# Patient Record
Sex: FEMALE | Race: WHITE | Employment: FULL TIME | URBAN - METROPOLITAN AREA
[De-identification: names, ages, dates, MRNs, and addresses within clinical notes are randomized per-mention and may not be internally consistent; named-entity substitution may affect disease eponyms.]

---

## 2017-03-19 ENCOUNTER — HOSPITAL ENCOUNTER (OUTPATIENT)
Dept: OTHER | Age: 32
Discharge: HOME | End: 2017-03-19
Attending: INTERNAL MEDICINE

## 2017-03-21 LAB
CULTURE: NORMAL
REPORT STATUS: NORMAL
REQUEST PROBLEM: NORMAL
SPECIMEN: NORMAL

## 2018-10-12 ENCOUNTER — HOSPITAL ENCOUNTER (OUTPATIENT)
Age: 33
Setting detail: SPECIMEN
Discharge: HOME | End: 2018-10-12
Payer: COMMERCIAL

## 2018-10-12 PROCEDURE — 87086 URINE CULTURE/COLONY COUNT: CPT

## 2018-10-12 PROCEDURE — 87077 CULTURE AEROBIC IDENTIFY: CPT

## 2018-10-12 PROCEDURE — 87186 SC STD MICRODIL/AGAR DIL: CPT

## 2018-10-15 LAB
CULTURE: NORMAL
Lab: NORMAL
ORGANISM: NORMAL
REPORT STATUS: NORMAL
SPECIMEN: NORMAL
TOTAL COLONY COUNT: NORMAL

## 2018-12-08 ENCOUNTER — APPOINTMENT (OUTPATIENT)
Dept: CT IMAGING | Age: 33
End: 2018-12-08
Payer: COMMERCIAL

## 2018-12-08 ENCOUNTER — HOSPITAL ENCOUNTER (EMERGENCY)
Age: 33
Discharge: HOME | End: 2018-12-08
Attending: EMERGENCY MEDICINE
Payer: COMMERCIAL

## 2018-12-08 ENCOUNTER — APPOINTMENT (OUTPATIENT)
Dept: GENERAL RADIOLOGY | Age: 33
End: 2018-12-08
Payer: COMMERCIAL

## 2018-12-08 VITALS
OXYGEN SATURATION: 98 % | SYSTOLIC BLOOD PRESSURE: 89 MMHG | WEIGHT: 105 LBS | BODY MASS INDEX: 18.61 KG/M2 | HEART RATE: 84 BPM | HEIGHT: 63 IN | DIASTOLIC BLOOD PRESSURE: 61 MMHG | TEMPERATURE: 97.8 F | RESPIRATION RATE: 20 BRPM

## 2018-12-08 DIAGNOSIS — J02.9 ACUTE PHARYNGITIS, UNSPECIFIED ETIOLOGY: Primary | ICD-10-CM

## 2018-12-08 DIAGNOSIS — D72.829 LEUKOCYTOSIS, UNSPECIFIED TYPE: ICD-10-CM

## 2018-12-08 LAB
ALBUMIN SERPL-MCNC: 4.3 GM/DL (ref 3.4–5)
ALP BLD-CCNC: 123 IU/L (ref 40–129)
ALT SERPL-CCNC: 15 U/L (ref 10–40)
ANION GAP SERPL CALCULATED.3IONS-SCNC: 10 MMOL/L (ref 4–16)
AST SERPL-CCNC: 19 IU/L (ref 15–37)
BACTERIA: NEGATIVE /HPF
BASOPHILS ABSOLUTE: 0.1 K/CU MM
BASOPHILS RELATIVE PERCENT: 0.4 % (ref 0–1)
BILIRUB SERPL-MCNC: 0.4 MG/DL (ref 0–1)
BILIRUBIN URINE: NEGATIVE MG/DL
BLOOD, URINE: ABNORMAL
BUN BLDV-MCNC: 15 MG/DL (ref 6–23)
CALCIUM SERPL-MCNC: 9.4 MG/DL (ref 8.3–10.6)
CHLORIDE BLD-SCNC: 97 MMOL/L (ref 99–110)
CLARITY: CLEAR
CO2: 28 MMOL/L (ref 21–32)
COLOR: YELLOW
CREAT SERPL-MCNC: 0.9 MG/DL (ref 0.6–1.1)
DIFFERENTIAL TYPE: ABNORMAL
EOSINOPHILS ABSOLUTE: 0 K/CU MM
EOSINOPHILS RELATIVE PERCENT: 0.1 % (ref 0–3)
GFR AFRICAN AMERICAN: >60 ML/MIN/1.73M2
GFR NON-AFRICAN AMERICAN: >60 ML/MIN/1.73M2
GLUCOSE BLD-MCNC: 133 MG/DL (ref 70–99)
GLUCOSE BLD-MCNC: 139 MG/DL (ref 70–99)
GLUCOSE, URINE: NEGATIVE MG/DL
HCG QUALITATIVE: NEGATIVE
HCT VFR BLD CALC: 46 % (ref 37–47)
HEMOGLOBIN: 15.2 GM/DL (ref 12.5–16)
HETEROPHILE ANTIBODIES: NEGATIVE
IMMATURE NEUTROPHIL %: 0.7 % (ref 0–0.43)
KETONES, URINE: NEGATIVE MG/DL
LACTATE: 1 MMOL/L (ref 0.4–2)
LEUKOCYTE ESTERASE, URINE: NEGATIVE
LYMPHOCYTES ABSOLUTE: 1.9 K/CU MM
LYMPHOCYTES RELATIVE PERCENT: 8.3 % (ref 24–44)
MCH RBC QN AUTO: 31.9 PG (ref 27–31)
MCHC RBC AUTO-ENTMCNC: 33 % (ref 32–36)
MCV RBC AUTO: 96.6 FL (ref 78–100)
MONOCYTES ABSOLUTE: 2 K/CU MM
MONOCYTES RELATIVE PERCENT: 8.6 % (ref 0–4)
MUCUS: ABNORMAL HPF
NITRITE URINE, QUANTITATIVE: NEGATIVE
NUCLEATED RBC %: 0 %
PDW BLD-RTO: 12 % (ref 11.7–14.9)
PH, URINE: 6 (ref 5–8)
PLATELET # BLD: 258 K/CU MM (ref 140–440)
PMV BLD AUTO: 11.2 FL (ref 7.5–11.1)
POTASSIUM SERPL-SCNC: 4 MMOL/L (ref 3.5–5.1)
PROTEIN UA: 30 MG/DL
RAPID INFLUENZA  B AGN: NEGATIVE
RAPID INFLUENZA A AGN: NEGATIVE
RBC # BLD: 4.76 M/CU MM (ref 4.2–5.4)
RBC URINE: 12 /HPF (ref 0–6)
SEGMENTED NEUTROPHILS ABSOLUTE COUNT: 18.9 K/CU MM
SEGMENTED NEUTROPHILS RELATIVE PERCENT: 81.9 % (ref 36–66)
SODIUM BLD-SCNC: 135 MMOL/L (ref 135–145)
SPECIFIC GRAVITY UA: >1.06 (ref 1–1.03)
SQUAMOUS EPITHELIAL: 10 /HPF
TOTAL IMMATURE NEUTOROPHIL: 0.15 K/CU MM
TOTAL NUCLEATED RBC: 0 K/CU MM
TOTAL PROTEIN: 7.7 GM/DL (ref 6.4–8.2)
TRICHOMONAS: ABNORMAL /HPF
TROPONIN T: <0.01 NG/ML
UROBILINOGEN, URINE: NORMAL MG/DL (ref 0.2–1)
WBC # BLD: 23 K/CU MM (ref 4–10.5)
WBC UA: <1 /HPF (ref 0–5)

## 2018-12-08 PROCEDURE — 96374 THER/PROPH/DIAG INJ IV PUSH: CPT

## 2018-12-08 PROCEDURE — 87081 CULTURE SCREEN ONLY: CPT

## 2018-12-08 PROCEDURE — 99284 EMERGENCY DEPT VISIT MOD MDM: CPT

## 2018-12-08 PROCEDURE — 82962 GLUCOSE BLOOD TEST: CPT

## 2018-12-08 PROCEDURE — 83605 ASSAY OF LACTIC ACID: CPT

## 2018-12-08 PROCEDURE — 2580000003 HC RX 258: Performed by: EMERGENCY MEDICINE

## 2018-12-08 PROCEDURE — 85025 COMPLETE CBC W/AUTO DIFF WBC: CPT

## 2018-12-08 PROCEDURE — 71045 X-RAY EXAM CHEST 1 VIEW: CPT

## 2018-12-08 PROCEDURE — 80053 COMPREHEN METABOLIC PANEL: CPT

## 2018-12-08 PROCEDURE — 6360000004 HC RX CONTRAST MEDICATION: Performed by: EMERGENCY MEDICINE

## 2018-12-08 PROCEDURE — 86318 IA INFECTIOUS AGENT ANTIBODY: CPT

## 2018-12-08 PROCEDURE — 87430 STREP A AG IA: CPT

## 2018-12-08 PROCEDURE — 6360000002 HC RX W HCPCS: Performed by: EMERGENCY MEDICINE

## 2018-12-08 PROCEDURE — 70491 CT SOFT TISSUE NECK W/DYE: CPT

## 2018-12-08 PROCEDURE — 6370000000 HC RX 637 (ALT 250 FOR IP): Performed by: EMERGENCY MEDICINE

## 2018-12-08 PROCEDURE — 81001 URINALYSIS AUTO W/SCOPE: CPT

## 2018-12-08 PROCEDURE — 87804 INFLUENZA ASSAY W/OPTIC: CPT

## 2018-12-08 PROCEDURE — 84703 CHORIONIC GONADOTROPIN ASSAY: CPT

## 2018-12-08 PROCEDURE — 84484 ASSAY OF TROPONIN QUANT: CPT

## 2018-12-08 PROCEDURE — 36415 COLL VENOUS BLD VENIPUNCTURE: CPT

## 2018-12-08 PROCEDURE — 93005 ELECTROCARDIOGRAM TRACING: CPT | Performed by: EMERGENCY MEDICINE

## 2018-12-08 RX ORDER — 0.9 % SODIUM CHLORIDE 0.9 %
1000 INTRAVENOUS SOLUTION INTRAVENOUS ONCE
Status: COMPLETED | OUTPATIENT
Start: 2018-12-08 | End: 2018-12-08

## 2018-12-08 RX ORDER — AZITHROMYCIN 250 MG/1
TABLET, FILM COATED ORAL
Qty: 1 PACKET | Refills: 0 | Status: SHIPPED | OUTPATIENT
Start: 2018-12-08 | End: 2018-12-18

## 2018-12-08 RX ORDER — AZITHROMYCIN 250 MG/1
500 TABLET, FILM COATED ORAL ONCE
Status: COMPLETED | OUTPATIENT
Start: 2018-12-08 | End: 2018-12-08

## 2018-12-08 RX ORDER — KETOROLAC TROMETHAMINE 30 MG/ML
30 INJECTION, SOLUTION INTRAMUSCULAR; INTRAVENOUS ONCE
Status: COMPLETED | OUTPATIENT
Start: 2018-12-08 | End: 2018-12-08

## 2018-12-08 RX ORDER — METHYLPREDNISOLONE 4 MG/1
TABLET ORAL
Qty: 1 KIT | Refills: 0 | Status: SHIPPED | OUTPATIENT
Start: 2018-12-08 | End: 2019-11-02

## 2018-12-08 RX ORDER — IBUPROFEN 600 MG/1
600 TABLET ORAL EVERY 6 HOURS PRN
Qty: 20 TABLET | Refills: 0 | Status: SHIPPED | OUTPATIENT
Start: 2018-12-08 | End: 2019-01-07

## 2018-12-08 RX ADMIN — SODIUM CHLORIDE 1000 ML: 9 INJECTION, SOLUTION INTRAVENOUS at 17:49

## 2018-12-08 RX ADMIN — DEXAMETHASONE INTENSOL 10 MG: 1 SOLUTION, CONCENTRATE ORAL at 17:49

## 2018-12-08 RX ADMIN — KETOROLAC TROMETHAMINE 30 MG: 30 INJECTION, SOLUTION INTRAMUSCULAR at 15:52

## 2018-12-08 RX ADMIN — AZITHROMYCIN 500 MG: 250 TABLET, FILM COATED ORAL at 19:50

## 2018-12-08 RX ADMIN — IOPAMIDOL 75 ML: 755 INJECTION, SOLUTION INTRAVENOUS at 17:42

## 2018-12-08 RX ADMIN — SODIUM CHLORIDE 1000 ML: 9 INJECTION, SOLUTION INTRAVENOUS at 15:52

## 2018-12-08 ASSESSMENT — PAIN DESCRIPTION - PAIN TYPE: TYPE: ACUTE PAIN

## 2018-12-08 ASSESSMENT — PAIN DESCRIPTION - LOCATION: LOCATION: THROAT

## 2018-12-08 ASSESSMENT — PAIN DESCRIPTION - ONSET: ONSET: ON-GOING

## 2018-12-08 ASSESSMENT — PAIN DESCRIPTION - FREQUENCY: FREQUENCY: CONTINUOUS

## 2018-12-08 ASSESSMENT — PAIN SCALES - GENERAL: PAINLEVEL_OUTOF10: 7

## 2018-12-08 NOTE — ED PROVIDER NOTES
Current Outpatient Prescriptions   Medication Sig Dispense Refill    ibuprofen (IBU) 600 MG tablet Take 1 tablet by mouth every 6 hours as needed for Pain 20 tablet 0    methylPREDNISolone (MEDROL DOSEPACK) 4 MG tablet Take by mouth. 1 kit 0    azithromycin (ZITHROMAX) 250 MG tablet Take 2 tablets (500 mg) on Day 1, followed by 1 tablet (250 mg) once daily on Days 2 through 5. 1 packet 0    Prenatal Multivit-Min-Fe-FA (PRENATAL #2 PO) Take by mouth       Allergies   Allergen Reactions    Cephalexin Swelling     Nursing Notes Reviewed    ROS:  At least 10 systems reviewed and otherwise negative except as in the [address] Physical Exam:  ED Triage Vitals [12/08/18 1510]   Enc Vitals Group      /74      Pulse 77      Resp 16      Temp 97.8 °F (36.6 °C)      Temp Source Oral      SpO2 100 %      Weight 105 lb (47.6 kg)      Height 5' 3\" (1.6 m)      Head Circumference       Peak Flow       Pain Score       Pain Loc       Pain Edu? Excl. in [address]? My pulse oximetry interpretation is which is within the normal range    GENERAL APPEARANCE: Awake and alert. Cooperative. No acute distress. HEAD: Normocephalic. Atraumatic. EYES: EOM's grossly intact. Sclera anicteric. ENT: Mucous membranes are moist. Tolerates saliva. No trismus. Erythematous throat. NECK: Supple. No meningismus. Trachea midline. HEART: RRR. Radial pulses 2+. LUNGS: Respirations unlabored. CTAB  ABDOMEN: Soft. Non-tender. No guarding or rebound. Normal bowel sounds. EXTREMITIES: No acute deformities. No pitting edema. SKIN: Warm and dry. NEUROLOGICAL: No gross facial drooping. Moves all 4 extremities spontaneously. PSYCHIATRIC: Normal mood.     I have reviewed and interpreted all of the currently available lab results from this visit (if applicable):  Results for orders placed or performed during the hospital encounter of 12/08/18   Strep Screen Group A Throat   Result Value Ref Range    Specimen THROAT     Special Requests

## 2018-12-09 LAB
EKG ATRIAL RATE: 102 BPM
EKG DIAGNOSIS: NORMAL
EKG P AXIS: 69 DEGREES
EKG P-R INTERVAL: 128 MS
EKG Q-T INTERVAL: 336 MS
EKG QRS DURATION: 82 MS
EKG QTC CALCULATION (BAZETT): 437 MS
EKG R AXIS: 56 DEGREES
EKG T AXIS: 60 DEGREES
EKG VENTRICULAR RATE: 102 BPM

## 2018-12-09 PROCEDURE — 93010 ELECTROCARDIOGRAM REPORT: CPT | Performed by: INTERNAL MEDICINE

## 2018-12-10 LAB
CULTURE: NORMAL
Lab: NORMAL
REPORT STATUS: NORMAL
SPECIMEN: NORMAL
STREP A DIRECT SCREEN: NEGATIVE

## 2018-12-19 ENCOUNTER — HOSPITAL ENCOUNTER (OUTPATIENT)
Dept: ULTRASOUND IMAGING | Age: 33
Discharge: HOME | End: 2018-12-19
Payer: COMMERCIAL

## 2018-12-19 DIAGNOSIS — R22.1 SENSATION OF LUMP IN THROAT: ICD-10-CM

## 2018-12-19 PROCEDURE — 76536 US EXAM OF HEAD AND NECK: CPT

## 2019-01-11 ENCOUNTER — HOSPITAL ENCOUNTER (EMERGENCY)
Age: 34
Discharge: HOME | End: 2019-01-11
Payer: COMMERCIAL

## 2019-01-11 ENCOUNTER — APPOINTMENT (OUTPATIENT)
Dept: GENERAL RADIOLOGY | Age: 34
End: 2019-01-11
Payer: COMMERCIAL

## 2019-01-11 VITALS
WEIGHT: 104 LBS | HEIGHT: 63 IN | RESPIRATION RATE: 16 BRPM | SYSTOLIC BLOOD PRESSURE: 101 MMHG | DIASTOLIC BLOOD PRESSURE: 71 MMHG | OXYGEN SATURATION: 98 % | TEMPERATURE: 97.7 F | HEART RATE: 77 BPM | BODY MASS INDEX: 18.43 KG/M2

## 2019-01-11 DIAGNOSIS — S59.912A INJURY OF LEFT FOREARM, INITIAL ENCOUNTER: Primary | ICD-10-CM

## 2019-01-11 PROCEDURE — 73090 X-RAY EXAM OF FOREARM: CPT

## 2019-01-11 PROCEDURE — 99283 EMERGENCY DEPT VISIT LOW MDM: CPT

## 2019-01-11 ASSESSMENT — PAIN SCALES - GENERAL: PAINLEVEL_OUTOF10: 7

## 2019-01-11 ASSESSMENT — PAIN DESCRIPTION - PAIN TYPE: TYPE: ACUTE PAIN

## 2019-04-07 ENCOUNTER — HOSPITAL ENCOUNTER (EMERGENCY)
Age: 34
Discharge: HOME | End: 2019-04-07
Payer: COMMERCIAL

## 2019-04-07 VITALS
DIASTOLIC BLOOD PRESSURE: 66 MMHG | HEIGHT: 63 IN | TEMPERATURE: 97.9 F | OXYGEN SATURATION: 99 % | WEIGHT: 104 LBS | RESPIRATION RATE: 15 BRPM | BODY MASS INDEX: 18.43 KG/M2 | HEART RATE: 99 BPM | SYSTOLIC BLOOD PRESSURE: 105 MMHG

## 2019-04-07 DIAGNOSIS — R10.2 SUPRAPUBIC ABDOMINAL PAIN: ICD-10-CM

## 2019-04-07 DIAGNOSIS — N39.0 URINARY TRACT INFECTION WITHOUT HEMATURIA, SITE UNSPECIFIED: Primary | ICD-10-CM

## 2019-04-07 LAB
ALBUMIN SERPL-MCNC: 4 GM/DL (ref 3.4–5)
ALP BLD-CCNC: 85 IU/L (ref 40–129)
ALT SERPL-CCNC: 19 U/L (ref 10–40)
ANION GAP SERPL CALCULATED.3IONS-SCNC: 9 MMOL/L (ref 4–16)
AST SERPL-CCNC: 20 IU/L (ref 15–37)
BACTERIA: ABNORMAL /HPF
BASOPHILS ABSOLUTE: 0.1 K/CU MM
BASOPHILS RELATIVE PERCENT: 0.7 % (ref 0–1)
BILIRUB SERPL-MCNC: 0.3 MG/DL (ref 0–1)
BILIRUBIN URINE: NEGATIVE MG/DL
BLOOD, URINE: ABNORMAL
BUN BLDV-MCNC: 15 MG/DL (ref 6–23)
CALCIUM SERPL-MCNC: 8.5 MG/DL (ref 8.3–10.6)
CHLORIDE BLD-SCNC: 100 MMOL/L (ref 99–110)
CLARITY: ABNORMAL
CO2: 26 MMOL/L (ref 21–32)
COLOR: YELLOW
CREAT SERPL-MCNC: 0.9 MG/DL (ref 0.6–1.1)
DIFFERENTIAL TYPE: ABNORMAL
EOSINOPHILS ABSOLUTE: 0.1 K/CU MM
EOSINOPHILS RELATIVE PERCENT: 1.2 % (ref 0–3)
GFR AFRICAN AMERICAN: >60 ML/MIN/1.73M2
GFR NON-AFRICAN AMERICAN: >60 ML/MIN/1.73M2
GLUCOSE BLD-MCNC: 81 MG/DL (ref 70–99)
GLUCOSE, URINE: NEGATIVE MG/DL
HCG QUALITATIVE: NEGATIVE
HCT VFR BLD CALC: 42.8 % (ref 37–47)
HEMOGLOBIN: 13.8 GM/DL (ref 12.5–16)
IMMATURE NEUTROPHIL %: 0.2 % (ref 0–0.43)
KETONES, URINE: NEGATIVE MG/DL
LEUKOCYTE ESTERASE, URINE: ABNORMAL
LIPASE: 33 IU/L (ref 13–60)
LYMPHOCYTES ABSOLUTE: 3.2 K/CU MM
LYMPHOCYTES RELATIVE PERCENT: 28.2 % (ref 24–44)
MCH RBC QN AUTO: 31.1 PG (ref 27–31)
MCHC RBC AUTO-ENTMCNC: 32.2 % (ref 32–36)
MCV RBC AUTO: 96.4 FL (ref 78–100)
MONOCYTES ABSOLUTE: 0.9 K/CU MM
MONOCYTES RELATIVE PERCENT: 8.1 % (ref 0–4)
MUCUS: ABNORMAL HPF
NITRITE URINE, QUANTITATIVE: NEGATIVE
NUCLEATED RBC %: 0 %
PDW BLD-RTO: 11.9 % (ref 11.7–14.9)
PH, URINE: 5 (ref 5–8)
PLATELET # BLD: 279 K/CU MM (ref 140–440)
PMV BLD AUTO: 10.7 FL (ref 7.5–11.1)
POTASSIUM SERPL-SCNC: 3.8 MMOL/L (ref 3.5–5.1)
PROTEIN UA: 30 MG/DL
RBC # BLD: 4.44 M/CU MM (ref 4.2–5.4)
RBC URINE: 7 /HPF (ref 0–6)
SEGMENTED NEUTROPHILS ABSOLUTE COUNT: 7 K/CU MM
SEGMENTED NEUTROPHILS RELATIVE PERCENT: 61.6 % (ref 36–66)
SODIUM BLD-SCNC: 135 MMOL/L (ref 135–145)
SPECIFIC GRAVITY UA: 1.03 (ref 1–1.03)
SQUAMOUS EPITHELIAL: 15 /HPF
TOTAL IMMATURE NEUTOROPHIL: 0.02 K/CU MM
TOTAL NUCLEATED RBC: 0 K/CU MM
TOTAL PROTEIN: 6.5 GM/DL (ref 6.4–8.2)
TRICHOMONAS: ABNORMAL /HPF
UROBILINOGEN, URINE: 1 MG/DL (ref 0.2–1)
WBC # BLD: 11.3 K/CU MM (ref 4–10.5)
WBC UA: 2 /HPF (ref 0–5)

## 2019-04-07 PROCEDURE — 84703 CHORIONIC GONADOTROPIN ASSAY: CPT

## 2019-04-07 PROCEDURE — 81001 URINALYSIS AUTO W/SCOPE: CPT

## 2019-04-07 PROCEDURE — 83690 ASSAY OF LIPASE: CPT

## 2019-04-07 PROCEDURE — 99284 EMERGENCY DEPT VISIT MOD MDM: CPT

## 2019-04-07 PROCEDURE — 85025 COMPLETE CBC W/AUTO DIFF WBC: CPT

## 2019-04-07 PROCEDURE — 36415 COLL VENOUS BLD VENIPUNCTURE: CPT

## 2019-04-07 PROCEDURE — 80053 COMPREHEN METABOLIC PANEL: CPT

## 2019-04-07 RX ORDER — SULFAMETHOXAZOLE AND TRIMETHOPRIM 800; 160 MG/1; MG/1
1 TABLET ORAL 2 TIMES DAILY
Qty: 14 TABLET | Refills: 0 | Status: SHIPPED | OUTPATIENT
Start: 2019-04-07 | End: 2019-04-14

## 2019-04-07 RX ORDER — PHENAZOPYRIDINE HYDROCHLORIDE 100 MG/1
100 TABLET, FILM COATED ORAL 3 TIMES DAILY PRN
Qty: 9 TABLET | Refills: 0 | Status: SHIPPED | OUTPATIENT
Start: 2019-04-07 | End: 2019-04-10

## 2019-04-07 ASSESSMENT — PAIN DESCRIPTION - PAIN TYPE: TYPE: ACUTE PAIN

## 2019-04-07 ASSESSMENT — PAIN DESCRIPTION - LOCATION: LOCATION: ABDOMEN

## 2019-04-07 ASSESSMENT — PAIN DESCRIPTION - ONSET: ONSET: ON-GOING

## 2019-04-07 ASSESSMENT — PAIN DESCRIPTION - ORIENTATION: ORIENTATION: ANTERIOR;LOWER;RIGHT;LEFT

## 2019-04-07 ASSESSMENT — PAIN DESCRIPTION - FREQUENCY: FREQUENCY: CONTINUOUS

## 2019-04-07 NOTE — ED NOTES
Patient refusing saline lock at this time. She states that she does not want any medications. Halie Gomez RN  04/07/19 1663

## 2019-04-07 NOTE — ED PROVIDER NOTES
eMERGENCY dEPARTMENT eNCOUnter         [address] [facility]     PCP: Hamilton Rocha MD    CHIEF COMPLAINT    Chief Complaint   Patient presents with    Abdominal Pain     started this morning       HPI    Angelica Foreman is a 26 y.o. female who presents with abdominal pain, dysuria and concern for UTI. Onset prior to arrival upon waking this morning. Context is patient states that she began having cramping pain across the lower abdomen. States that when she urinated, her urine looked  \"orange\" and was concerned she might be starting a UTI. States she has had similar symptoms with UTIs in the past.  Denying any dysuria until giving a urine sample in the ED. No gross hematuria, increased frequency or urgency. Denying any associated flank pain. States that she did go to work but had worsening pain and felt generally fatigued and reported subjective fevers. She was sent home and tried to be seen at an urgent care however they are all closed as it is Sunday. No nausea or vomiting or diarrhea. No concern for pregnancy, no abnormal vaginal discharge or concern for STD. Denies any history of previous abdominal surgeries or known GI disorders. Patient states that she had a history of a kidney stone as a teenager but none since. REVIEW OF SYSTEMS    Constitutional:  Denies fever, chills, weight loss or weakness   HENT:  Denies sore throat or ear pain   Cardiovascular:  Denies chest pain, palpitations or swelling   Respiratory:  Denies cough or shortness of breath   GI:  See HPI above  : See HPI  Musculoskeletal:  Denies back pain or groin pain or masses. No pain or swelling of extremities.   Skin:  Denies rash  Neurologic:  Denies headache, focal weakness or sensory changes   Endocrine:  Denies polyuria or polydypsia   Lymphatic:  Denies swollen glands     All other review of systems are negative  See HPI and nursing notes for additional information PAST MEDICAL & SURGICAL HISTORY    Past Medical History:   Diagnosis Date    Anxiety     Depression      No past surgical history on file. CURRENT MEDICATIONS    Current Outpatient Rx   Medication Sig Dispense Refill    phenazopyridine (PYRIDIUM) 100 MG tablet Take 1 tablet by mouth 3 times daily as needed for Pain (dysuria) 9 tablet 0    sulfamethoxazole-trimethoprim (BACTRIM DS) 800-160 MG per tablet Take 1 tablet by mouth 2 times daily for 7 days 14 tablet 0    sertraline (ZOLOFT) 50 MG tablet Take 50 mg by mouth daily      ibuprofen (IBU) 600 MG tablet Take 1 tablet by mouth every 6 hours as needed for Pain 20 tablet 0    methylPREDNISolone (MEDROL DOSEPACK) 4 MG tablet Take by mouth.  1 kit 0    Prenatal Multivit-Min-Fe-FA (PRENATAL #2 PO) Take by mouth         ALLERGIES    Allergies   Allergen Reactions    Cephalexin Swelling       SOCIAL AND FAMILY HISTORY    Social History     Socioeconomic History    Marital status: Legally      Spouse name: Not on file    Number of children: Not on file    Years of education: Not on file    Highest education level: Not on file   Occupational History    Not on file   Social Needs    Financial resource strain: Not on file    Food insecurity:     Worry: Not on file     Inability: Not on file    Transportation needs:     Medical: Not on file     Non-medical: Not on file   Tobacco Use    Smoking status: Current Every Day Smoker     Packs/day: 1.00     Types: Cigarettes    Smokeless tobacco: Never Used   Substance and Sexual Activity    Alcohol use: No     Comment: socaially     Drug use: No    Sexual activity: Not on file   Lifestyle    Physical activity:     Days per week: Not on file     Minutes per session: Not on file    Stress: Not on file   Relationships    Social connections:     Talks on phone: Not on file     Gets together: Not on file     Attends Sabianist service: Not on file     Active member of club or organization: Not on file     Attends meetings of clubs or organizations: Not on file     Relationship status: Not on file    Intimate partner violence:     Fear of current or ex partner: Not on file     Emotionally abused: Not on file     Physically abused: Not on file     Forced sexual activity: Not on file   Other Topics Concern    Not on file   Social History Narrative    Not on file     No family history on file. PHYSICAL EXAM    VITAL SIGNS: /66   Pulse 99   Temp 97.9 °F (36.6 °C) (Oral)   Resp 15   Ht 5' 3\" (1.6 m)   Wt 104 lb (47.2 kg)   SpO2 99%   BMI 18.42 kg/m²   General:  Well developed, thin female, nontoxic, In no acute distress  Eyes:  Sclera nonicteric, Conjunctiva moist, No discharge  Head:  Normocephalic, Atramautic  Neck/Lymphatics: Supple, no JVD, no swollen nodes  Respiratory:  Clear to ausculation bilaterally, No retractions, Non labored breathing  Cardiovascular:  RRR, No murmurs/gallops/thrills  GI:   No gross discoloration. Bowel sounds present in all quadrants, No audible bruits. Soft,  Nondistended. +mild suprapubic abdominal tenderness without rebound tenderness or guarding, No palpable pulsatile masses or obvious hernias. No McBurney's point tenderness Negative Rovsing sign. Negative Duque's sign.   Back:  No CVA tenderness to percussion bilaterally  Musculoskeletal:  No edema, No deformity  Peripheral Vascular: Distal pulses 2+ equal bilaterally  Integument: No rash, Normal turgor  Neurologic:  Alert & oriented, Normal speech  Psychiatric: Cooperative, pleasant affect       I have reviewed and interpreted all of the currently available lab results from this visit (if applicable):  Results for orders placed or performed during the hospital encounter of 04/07/19   CBC auto diff   Result Value Ref Range    WBC 11.3 (H) 4.0 - 10.5 K/CU MM    RBC 4.44 4.2 - 5.4 M/CU MM    Hemoglobin 13.8 12.5 - 16.0 GM/DL    Hematocrit 42.8 37 - 47 %    MCV 96.4 78 - 100 FL    MCH 31.1 (H) 27 - 31 PG    MCHC 32.2 32.0 - 36.0 %    RDW 11.9 11.7 - 14.9 %    Platelets 974 879 - 838 K/CU MM    MPV 10.7 7.5 - 11.1 FL    Differential Type AUTOMATED DIFFERENTIAL     Segs Relative 61.6 36 - 66 %    Lymphocytes % 28.2 24 - 44 %    Monocytes % 8.1 (H) 0 - 4 %    Eosinophils % 1.2 0 - 3 %    Basophils % 0.7 0 - 1 %    Segs Absolute 7.0 K/CU MM    Lymphocytes # 3.2 K/CU MM    Monocytes # 0.9 K/CU MM    Eosinophils # 0.1 K/CU MM    Basophils # 0.1 K/CU MM    Nucleated RBC % 0.0 %    Total Nucleated RBC 0.0 K/CU MM    Total Immature Neutrophil 0.02 K/CU MM    Immature Neutrophil % 0.2 0 - 0.43 %   CMP   Result Value Ref Range    Sodium 135 135 - 145 MMOL/L    Potassium 3.8 3.5 - 5.1 MMOL/L    Chloride 100 99 - 110 mMol/L    CO2 26 21 - 32 MMOL/L    BUN 15 6 - 23 MG/DL    CREATININE 0.9 0.6 - 1.1 MG/DL    Glucose 81 70 - 99 MG/DL    Calcium 8.5 8.3 - 10.6 MG/DL    Alb 4.0 3.4 - 5.0 GM/DL    Total Protein 6.5 6.4 - 8.2 GM/DL    Total Bilirubin 0.3 0.0 - 1.0 MG/DL    ALT 19 10 - 40 U/L    AST 20 15 - 37 IU/L    Alkaline Phosphatase 85 40 - 129 IU/L    GFR Non-African American >60 >60 mL/min/1.73m2    GFR African American >60 >60 mL/min/1.73m2    Anion Gap 9 4 - 16   Lipase   Result Value Ref Range    Lipase 33 13 - 60 IU/L   Urinalysis   Result Value Ref Range    Color, UA YELLOW YELLOW    Clarity, UA HAZY (A) CLEAR    Glucose, Urine NEGATIVE NEGATIVE MG/DL    Bilirubin Urine NEGATIVE NEGATIVE MG/DL    Ketones, Urine NEGATIVE NEGATIVE MG/DL    Specific Gravity, UA 1.027 1.001 - 1.035    Blood, Urine MODERATE (A) NEGATIVE    pH, Urine 5.0 5.0 - 8.0    Protein, UA 30 (A) NEGATIVE MG/DL    Urobilinogen, Urine 1 0.2 - 1.0 MG/DL    Nitrite Urine, Quantitative NEGATIVE NEGATIVE    Leukocyte Esterase, Urine TRACE (A) NEGATIVE    RBC, UA 7 (H) 0 - 6 /HPF    WBC, UA 2 0 - 5 /HPF    Bacteria, UA RARE (A) NEGATIVE /HPF    Squam Epithel, UA 15 /HPF    Mucus, UA FEW (A) NEGATIVE HPF    Trichomonas, UA NONE SEEN NONE SEEN /HPF   HCG Serum, care, initiation of oral antibiotics and close follow-up for serial abdominal exams cannot rule out other etiology early processes. Patient is comfortable and agreeable with this plan. I estimate there is low risk for acute appendicitis, bowel obstruction, cholecystitis, ruptured diverticulitis, incarcerated hernia, hemorrhagic pancreatitis, or perforated bowel/ulcer, ectopic pregnancy, ovarian torsion or tubo-ovarian ovarian abscess thus I consider the discharge disposition reasonable. She is discharged stable condition with pyridium. Reports an allergy to Keflex so per previous urine culture, started on bactrim. Encouraged rest, alternating tylenol and motrin for pain. Discussed close followup with PCP for serial abdominal exams. I discussed the unclear etiology of patient's symptoms today and the need for return to emergency department in 8-12 hours for repeat evaluation, sooner if symptoms worsen or any new symptoms develop. Patient agrees to return emergency department if symptoms worsen or any new symptoms develop. Comment: Please note this report has been produced using speech recognition software and may contain errors related to that system including errors in grammar, punctuation, and spelling, as well as words and phrases that may be inappropriate. If there are any questions or concerns please feel free to contact the dictating provider for clarification.           Enrique Grace PA-C  04/07/19 1958

## 2019-04-07 NOTE — ED TRIAGE NOTES
Pt. Arrived via EMS from home for anterior LLQ, LRQ abdominal pain. Pt. States that her urine is orange and red and that she is not currently taking any medications or antibiotics that are known to cause this symptom. Pt. Believes that she has a UTI, bladder, or Kidney infection. Pt. States that she has hx of these types of infections but, \"It's been a while since I've had one\". Secondary complaint: Pt. States that she recently switched makeup and the left side of her face is irritated and has lasted for a week. Pt. Requesting evaluation and wants to know \"Do I need an antibiotic to clear this up? I don't know why it won't go away, but I changed my makeup recently\". Pt is not seeing a dermatologist at this time. Pt states that it burns. Pt skin is scaly and is showing redness despite makeup on top of skin. Pt. States that she keeps picking at it.

## 2019-04-08 NOTE — PROGRESS NOTES
Patient prescribed Bactrim for acute cystitis. Patient called the ED stating that she is allergic to Sulfa and requested a new ABX be prescribed. Only allergy listed in chart is for Cephalexin (swelling). Spoke to [facility] who stated it was ok to call-in a new ABX for patient due to reported allergy. Prescription for Nitrofurantoin 100mg BID x 7 days called into [facility] ([address] [place] [address]). Allergy list in Epic updated.      Eduarda Farah RPh, PharmD, BCPS  4/8/2019   3:06 PM

## 2019-07-22 ENCOUNTER — HOSPITAL ENCOUNTER (OUTPATIENT)
Age: 34
Setting detail: SPECIMEN
Discharge: HOME | End: 2019-07-22
Payer: COMMERCIAL

## 2019-07-22 PROCEDURE — 87086 URINE CULTURE/COLONY COUNT: CPT

## 2019-07-22 PROCEDURE — 87186 SC STD MICRODIL/AGAR DIL: CPT

## 2019-07-22 PROCEDURE — 87077 CULTURE AEROBIC IDENTIFY: CPT

## 2019-07-25 LAB
CULTURE: ABNORMAL
Lab: ABNORMAL
SPECIMEN: ABNORMAL
TOTAL COLONY COUNT: ABNORMAL

## 2019-09-07 ENCOUNTER — HOSPITAL ENCOUNTER (EMERGENCY)
Age: 34
Discharge: LEFT BEFORE DISCHARGE | End: 2019-09-07
Payer: COMMERCIAL

## 2019-09-07 VITALS
SYSTOLIC BLOOD PRESSURE: 96 MMHG | HEART RATE: 85 BPM | RESPIRATION RATE: 16 BRPM | WEIGHT: 105 LBS | DIASTOLIC BLOOD PRESSURE: 68 MMHG | BODY MASS INDEX: 18.61 KG/M2 | TEMPERATURE: 97.4 F | OXYGEN SATURATION: 97 % | HEIGHT: 63 IN

## 2019-09-07 ASSESSMENT — PAIN DESCRIPTION - ORIENTATION: ORIENTATION: RIGHT

## 2019-09-07 ASSESSMENT — PAIN DESCRIPTION - PAIN TYPE: TYPE: ACUTE PAIN

## 2019-09-07 ASSESSMENT — PAIN DESCRIPTION - LOCATION: LOCATION: FLANK

## 2019-09-07 ASSESSMENT — PAIN SCALES - GENERAL: PAINLEVEL_OUTOF10: 7

## 2019-11-02 ENCOUNTER — APPOINTMENT (OUTPATIENT)
Dept: GENERAL RADIOLOGY | Age: 34
End: 2019-11-02
Payer: COMMERCIAL

## 2019-11-02 ENCOUNTER — HOSPITAL ENCOUNTER (EMERGENCY)
Age: 34
Discharge: HOME | End: 2019-11-02
Attending: EMERGENCY MEDICINE
Payer: COMMERCIAL

## 2019-11-02 VITALS
WEIGHT: 100 LBS | SYSTOLIC BLOOD PRESSURE: 95 MMHG | OXYGEN SATURATION: 99 % | TEMPERATURE: 98.1 F | BODY MASS INDEX: 17.72 KG/M2 | HEIGHT: 63 IN | RESPIRATION RATE: 16 BRPM | HEART RATE: 88 BPM | DIASTOLIC BLOOD PRESSURE: 74 MMHG

## 2019-11-02 DIAGNOSIS — J06.9 ACUTE UPPER RESPIRATORY INFECTION: ICD-10-CM

## 2019-11-02 DIAGNOSIS — N30.01 ACUTE CYSTITIS WITH HEMATURIA: Primary | ICD-10-CM

## 2019-11-02 LAB
BACTERIA: NEGATIVE /HPF
BILIRUBIN URINE: NEGATIVE MG/DL
BLOOD, URINE: ABNORMAL
CLARITY: ABNORMAL
COLOR: ABNORMAL
GLUCOSE, URINE: NEGATIVE MG/DL
INTERPRETATION: NORMAL
KETONES, URINE: ABNORMAL MG/DL
LEUKOCYTE ESTERASE, URINE: ABNORMAL
MUCUS: ABNORMAL HPF
NITRITE URINE, QUANTITATIVE: POSITIVE
PH, URINE: 5 (ref 5–8)
PREGNANCY, URINE: NEGATIVE
PROTEIN UA: 100 MG/DL
RBC URINE: 92 /HPF (ref 0–6)
SPECIFIC GRAVITY UA: 1.02 (ref 1–1.03)
SPECIFIC GRAVITY, URINE: 1.02 (ref 1–1.03)
SQUAMOUS EPITHELIAL: 9 /HPF
TRICHOMONAS: ABNORMAL /HPF
UROBILINOGEN, URINE: NORMAL MG/DL (ref 0.2–1)
WBC UA: 92 /HPF (ref 0–5)

## 2019-11-02 PROCEDURE — 87077 CULTURE AEROBIC IDENTIFY: CPT

## 2019-11-02 PROCEDURE — 81001 URINALYSIS AUTO W/SCOPE: CPT

## 2019-11-02 PROCEDURE — 6370000000 HC RX 637 (ALT 250 FOR IP): Performed by: EMERGENCY MEDICINE

## 2019-11-02 PROCEDURE — 71046 X-RAY EXAM CHEST 2 VIEWS: CPT

## 2019-11-02 PROCEDURE — 87186 SC STD MICRODIL/AGAR DIL: CPT

## 2019-11-02 PROCEDURE — 87086 URINE CULTURE/COLONY COUNT: CPT

## 2019-11-02 PROCEDURE — 93010 ELECTROCARDIOGRAM REPORT: CPT | Performed by: INTERNAL MEDICINE

## 2019-11-02 PROCEDURE — 81025 URINE PREGNANCY TEST: CPT

## 2019-11-02 PROCEDURE — 99284 EMERGENCY DEPT VISIT MOD MDM: CPT

## 2019-11-02 PROCEDURE — 93005 ELECTROCARDIOGRAM TRACING: CPT | Performed by: EMERGENCY MEDICINE

## 2019-11-02 RX ORDER — BENZONATATE 100 MG/1
100 CAPSULE ORAL 3 TIMES DAILY PRN
Qty: 20 CAPSULE | Refills: 0 | Status: SHIPPED | OUTPATIENT
Start: 2019-11-02 | End: 2019-11-09

## 2019-11-02 RX ORDER — CIPROFLOXACIN 500 MG/1
500 TABLET, FILM COATED ORAL 2 TIMES DAILY
Qty: 14 TABLET | Refills: 0 | Status: SHIPPED | OUTPATIENT
Start: 2019-11-02 | End: 2019-11-09

## 2019-11-02 RX ORDER — PHENAZOPYRIDINE HYDROCHLORIDE 100 MG/1
100 TABLET, FILM COATED ORAL 3 TIMES DAILY PRN
Qty: 10 TABLET | Refills: 0 | Status: SHIPPED | OUTPATIENT
Start: 2019-11-02 | End: 2019-11-06

## 2019-11-02 RX ORDER — PHENAZOPYRIDINE HYDROCHLORIDE 100 MG/1
200 TABLET, FILM COATED ORAL ONCE
Status: COMPLETED | OUTPATIENT
Start: 2019-11-02 | End: 2019-11-02

## 2019-11-02 RX ORDER — GUAIFENESIN AND CODEINE PHOSPHATE 100; 10 MG/5ML; MG/5ML
10 SOLUTION ORAL 3 TIMES DAILY PRN
Qty: 118 ML | Refills: 0 | Status: SHIPPED | OUTPATIENT
Start: 2019-11-02 | End: 2019-11-07

## 2019-11-02 RX ORDER — BENZONATATE 100 MG/1
100 CAPSULE ORAL ONCE
Status: COMPLETED | OUTPATIENT
Start: 2019-11-02 | End: 2019-11-02

## 2019-11-02 RX ORDER — CIPROFLOXACIN 500 MG/1
500 TABLET, FILM COATED ORAL ONCE
Status: COMPLETED | OUTPATIENT
Start: 2019-11-02 | End: 2019-11-02

## 2019-11-02 RX ADMIN — CIPROFLOXACIN HYDROCHLORIDE 500 MG: 500 TABLET, FILM COATED ORAL at 15:03

## 2019-11-02 RX ADMIN — PHENAZOPYRIDINE 200 MG: 100 TABLET ORAL at 15:03

## 2019-11-02 RX ADMIN — BENZONATATE 100 MG: 100 CAPSULE ORAL at 15:03

## 2019-11-02 ASSESSMENT — PAIN DESCRIPTION - DESCRIPTORS: DESCRIPTORS: ACHING

## 2019-11-02 ASSESSMENT — PAIN DESCRIPTION - PAIN TYPE: TYPE: ACUTE PAIN

## 2019-11-02 ASSESSMENT — PAIN DESCRIPTION - FREQUENCY: FREQUENCY: CONTINUOUS

## 2019-11-02 ASSESSMENT — PAIN DESCRIPTION - LOCATION: LOCATION: CHEST

## 2019-11-02 ASSESSMENT — PAIN SCALES - GENERAL: PAINLEVEL_OUTOF10: 4

## 2019-11-04 LAB
EKG ATRIAL RATE: 98 BPM
EKG DIAGNOSIS: NORMAL
EKG P AXIS: 74 DEGREES
EKG P-R INTERVAL: 112 MS
EKG Q-T INTERVAL: 476 MS
EKG QRS DURATION: 88 MS
EKG QTC CALCULATION (BAZETT): 607 MS
EKG R AXIS: 46 DEGREES
EKG T AXIS: 49 DEGREES
EKG VENTRICULAR RATE: 98 BPM

## 2019-11-05 LAB
CULTURE: ABNORMAL
Lab: ABNORMAL
SPECIMEN: ABNORMAL
TOTAL COLONY COUNT: ABNORMAL

## 2020-03-14 ENCOUNTER — APPOINTMENT (OUTPATIENT)
Dept: GENERAL RADIOLOGY | Age: 35
End: 2020-03-14
Payer: COMMERCIAL

## 2020-03-14 ENCOUNTER — HOSPITAL ENCOUNTER (EMERGENCY)
Age: 35
Discharge: HOME | End: 2020-03-14
Attending: EMERGENCY MEDICINE
Payer: COMMERCIAL

## 2020-03-14 VITALS
BODY MASS INDEX: 18.61 KG/M2 | HEART RATE: 83 BPM | TEMPERATURE: 97.9 F | DIASTOLIC BLOOD PRESSURE: 69 MMHG | SYSTOLIC BLOOD PRESSURE: 80 MMHG | OXYGEN SATURATION: 99 % | WEIGHT: 105 LBS | RESPIRATION RATE: 16 BRPM | HEIGHT: 63 IN

## 2020-03-14 LAB
ALBUMIN SERPL-MCNC: 4.2 GM/DL (ref 3.4–5)
ALP BLD-CCNC: 97 IU/L (ref 40–129)
ALT SERPL-CCNC: 17 U/L (ref 10–40)
ANION GAP SERPL CALCULATED.3IONS-SCNC: 14 MMOL/L (ref 4–16)
AST SERPL-CCNC: 27 IU/L (ref 15–37)
ATYPICAL LYMPHOCYTE ABSOLUTE COUNT: ABNORMAL
BACTERIA: ABNORMAL /HPF
BANDED NEUTROPHILS ABSOLUTE COUNT: 0.18 K/CU MM
BANDED NEUTROPHILS RELATIVE PERCENT: 2 % (ref 5–11)
BILIRUB SERPL-MCNC: 0.2 MG/DL (ref 0–1)
BILIRUBIN URINE: NEGATIVE MG/DL
BLOOD, URINE: ABNORMAL
BUN BLDV-MCNC: 14 MG/DL (ref 6–23)
CALCIUM SERPL-MCNC: 9.1 MG/DL (ref 8.3–10.6)
CHLORIDE BLD-SCNC: 98 MMOL/L (ref 99–110)
CLARITY: ABNORMAL
CO2: 23 MMOL/L (ref 21–32)
COLOR: ABNORMAL
CREAT SERPL-MCNC: 1.1 MG/DL (ref 0.6–1.1)
DIFFERENTIAL TYPE: ABNORMAL
EOSINOPHILS ABSOLUTE: 0.2 K/CU MM
EOSINOPHILS RELATIVE PERCENT: 2 % (ref 0–3)
GFR AFRICAN AMERICAN: >60 ML/MIN/1.73M2
GFR NON-AFRICAN AMERICAN: 56 ML/MIN/1.73M2
GLUCOSE BLD-MCNC: 127 MG/DL (ref 70–99)
GLUCOSE, URINE: NEGATIVE MG/DL
HCT VFR BLD CALC: 50.5 % (ref 37–47)
HEMOGLOBIN: 15.9 GM/DL (ref 12.5–16)
INTERPRETATION: NORMAL
KETONES, URINE: ABNORMAL MG/DL
LEUKOCYTE ESTERASE, URINE: ABNORMAL
LYMPHOCYTES ABSOLUTE: 4.8 K/CU MM
LYMPHOCYTES RELATIVE PERCENT: 55 % (ref 24–44)
MCH RBC QN AUTO: 31.4 PG (ref 27–31)
MCHC RBC AUTO-ENTMCNC: 31.5 % (ref 32–36)
MCV RBC AUTO: 99.8 FL (ref 78–100)
MONOCYTES ABSOLUTE: 1.1 K/CU MM
MONOCYTES RELATIVE PERCENT: 13 % (ref 0–4)
MUCUS: ABNORMAL HPF
NITRITE URINE, QUANTITATIVE: NEGATIVE
PDW BLD-RTO: 12.3 % (ref 11.7–14.9)
PH, URINE: 5 (ref 5–8)
PLATELET # BLD: 275 K/CU MM (ref 140–440)
PLT MORPHOLOGY: ABNORMAL
PMV BLD AUTO: 11 FL (ref 7.5–11.1)
POTASSIUM SERPL-SCNC: 4.3 MMOL/L (ref 3.5–5.1)
PREGNANCY, URINE: NEGATIVE
PROTEIN UA: 100 MG/DL
RAPID INFLUENZA  B AGN: NEGATIVE
RAPID INFLUENZA A AGN: NEGATIVE
RBC # BLD: 5.06 M/CU MM (ref 4.2–5.4)
RBC URINE: 88 /HPF (ref 0–6)
SEGMENTED NEUTROPHILS ABSOLUTE COUNT: 2.5 K/CU MM
SEGMENTED NEUTROPHILS RELATIVE PERCENT: 28 % (ref 36–66)
SMUDGE CELLS: PRESENT
SODIUM BLD-SCNC: 135 MMOL/L (ref 135–145)
SPECIFIC GRAVITY UA: 1.03 (ref 1–1.03)
SPECIFIC GRAVITY, URINE: 1.03 (ref 1–1.03)
SQUAMOUS EPITHELIAL: 54 /HPF
TOTAL PROTEIN: 7.1 GM/DL (ref 6.4–8.2)
TRICHOMONAS: ABNORMAL /HPF
UROBILINOGEN, URINE: 1 MG/DL (ref 0.2–1)
WBC # BLD: 8.8 K/CU MM (ref 4–10.5)
WBC # BLD: ABNORMAL 10*3/UL
WBC UA: 15 /HPF (ref 0–5)

## 2020-03-14 PROCEDURE — 96366 THER/PROPH/DIAG IV INF ADDON: CPT

## 2020-03-14 PROCEDURE — 80053 COMPREHEN METABOLIC PANEL: CPT

## 2020-03-14 PROCEDURE — 6360000002 HC RX W HCPCS: Performed by: PHYSICIAN ASSISTANT

## 2020-03-14 PROCEDURE — 85027 COMPLETE CBC AUTOMATED: CPT

## 2020-03-14 PROCEDURE — 85007 BL SMEAR W/DIFF WBC COUNT: CPT

## 2020-03-14 PROCEDURE — 87804 INFLUENZA ASSAY W/OPTIC: CPT

## 2020-03-14 PROCEDURE — 99283 EMERGENCY DEPT VISIT LOW MDM: CPT

## 2020-03-14 PROCEDURE — 71046 X-RAY EXAM CHEST 2 VIEWS: CPT

## 2020-03-14 PROCEDURE — 2580000003 HC RX 258: Performed by: PHYSICIAN ASSISTANT

## 2020-03-14 PROCEDURE — 87081 CULTURE SCREEN ONLY: CPT

## 2020-03-14 PROCEDURE — 81025 URINE PREGNANCY TEST: CPT

## 2020-03-14 PROCEDURE — 87430 STREP A AG IA: CPT

## 2020-03-14 PROCEDURE — 96365 THER/PROPH/DIAG IV INF INIT: CPT

## 2020-03-14 PROCEDURE — 81001 URINALYSIS AUTO W/SCOPE: CPT

## 2020-03-14 PROCEDURE — 96361 HYDRATE IV INFUSION ADD-ON: CPT

## 2020-03-14 RX ORDER — LEVOFLOXACIN 5 MG/ML
750 INJECTION, SOLUTION INTRAVENOUS EVERY 24 HOURS
Status: DISCONTINUED | OUTPATIENT
Start: 2020-03-14 | End: 2020-03-14 | Stop reason: HOSPADM

## 2020-03-14 RX ORDER — 0.9 % SODIUM CHLORIDE 0.9 %
1000 INTRAVENOUS SOLUTION INTRAVENOUS ONCE
Status: COMPLETED | OUTPATIENT
Start: 2020-03-14 | End: 2020-03-14

## 2020-03-14 RX ORDER — NITROFURANTOIN 25; 75 MG/1; MG/1
100 CAPSULE ORAL 2 TIMES DAILY
Qty: 14 CAPSULE | Refills: 0 | Status: SHIPPED | OUTPATIENT
Start: 2020-03-14 | End: 2020-03-21

## 2020-03-14 RX ADMIN — SODIUM CHLORIDE 1000 ML: 9 INJECTION, SOLUTION INTRAVENOUS at 13:12

## 2020-03-14 RX ADMIN — LEVOFLOXACIN 750 MG: 5 INJECTION, SOLUTION INTRAVENOUS at 15:22

## 2020-03-14 NOTE — LETTER
[facility] Emergency Department  [address] [facility] [address]  Phone: [phone]  Fax: [phone]    No name on file. March 14, 2020     Patient: Inder Roach   YOB: 1985   Date of Visit: 3/14/2020       To Whom It May Concern: It is my medical opinion that Samantha Teran may return to work on 03/16/2020. If you have any questions or concerns, please don't hesitate to call.     Sincerely,        Khoa Box RN

## 2020-03-14 NOTE — ED PROVIDER NOTES
K/CU MM    Differential Type MANUAL DIFFERENTIAL     Atypical Lymphocytes Absolute 2+     Smudge Cells PRESENT     WBC Morphology RBC MORPHOLOGY NORMAL     PLT Morphology FEW  LARGE  PLT NOTED ON SCAN      Comprehensive Metabolic Panel w/ Reflex to MG   Result Value Ref Range    Sodium 135 135 - 145 MMOL/L    Potassium 4.3 3.5 - 5.1 MMOL/L    Chloride 98 (L) 99 - 110 mMol/L    CO2 23 21 - 32 MMOL/L    BUN 14 6 - 23 MG/DL    CREATININE 1.1 0.6 - 1.1 MG/DL    Glucose 127 (H) 70 - 99 MG/DL    Calcium 9.1 8.3 - 10.6 MG/DL    Alb 4.2 3.4 - 5.0 GM/DL    Total Protein 7.1 6.4 - 8.2 GM/DL    Total Bilirubin 0.2 0.0 - 1.0 MG/DL    ALT 17 10 - 40 U/L    AST 27 15 - 37 IU/L    Alkaline Phosphatase 97 40 - 129 IU/L    GFR Non- 56 (L) >60 mL/min/1.73m2    GFR African American >60 >60 mL/min/1.73m2    Anion Gap 14 4 - 16   Urinalysis, reflex to microscopic   Result Value Ref Range    Color, UA BUD (A) YELLOW    Clarity, UA CLOUDY (A) CLEAR    Glucose, Urine NEGATIVE NEGATIVE MG/DL    Bilirubin Urine NEGATIVE NEGATIVE MG/DL    Ketones, Urine SMALL (A) NEGATIVE MG/DL    Specific Gravity, UA 1.027 1.001 - 1.035    Blood, Urine MODERATE (A) NEGATIVE    pH, Urine 5.0 5.0 - 8.0    Protein,  (A) NEGATIVE MG/DL    Urobilinogen, Urine 1 0.2 - 1.0 MG/DL    Nitrite Urine, Quantitative NEGATIVE NEGATIVE    Leukocyte Esterase, Urine MODERATE (A) NEGATIVE    RBC, UA 88 (H) 0 - 6 /HPF    WBC, UA 15 (H) 0 - 5 /HPF    Bacteria, UA MANY (A) NEGATIVE /HPF    Squam Epithel, UA 54 /HPF    Mucus, UA MANY (A) NEGATIVE HPF    Trichomonas, UA NONE SEEN NONE SEEN /HPF   Pregnancy, Urine   Result Value Ref Range    Pregnancy, Urine NEGATIVE NEGATIVE    Specific Gravity, Urine 1.027 1.001 - 1.035    Interpretation       HCG METHOD LIMITATIONS:  Very dilute specimens, as indicated  by low specific gravity, may have  insufficient concentration of HCG  to bring about a positive result.                   EKG        RADIOLOGY      XR CHEST STANDARD (2 VW)   Final Result   No acute cardiopulmonary process. ED COURSE & MEDICAL DECISION MAKING       Patient remained in stable condition throughout her ED course. Her revealed a negative strep screen negative influenza. CBC revealed no leukocytosis he had a mild hypochloremia and hyperglycemia. Chest x-ray revealed no acute cardiopulmonary process. Urine revealed urinary tract infection. She will be discharged at this time with antibiotics, told to follow-up with Dr. Rene Cheatham in 2 to 3 days and return to ED with any more problems. I discussed the case with Dr. Trudy Hancock he agreed with therapeutic modalities and discharge plans. Patient agrees to return emergency department if symptoms worsen or any new symptoms develop. Vital signs and nursing notes reviewed during ED course. Clinical  IMPRESSION    1. Acute cystitis without hematuria              Comment: Please note this report has been produced using speech recognition software and may contain errors related to that system including errors in grammar, punctuation, and spelling, as well as words and phrases that may be inappropriate. If there are any questions or concerns please feel free to contact the dictating provider for clarification.         [address] [facility]  03/15/20 1923

## 2020-03-14 NOTE — ED TRIAGE NOTES
Pt presents to the ED c/o fever that started on Thursday. Pt also stated that today she started feeling lightheaded.

## 2020-03-16 LAB
CULTURE: NORMAL
Lab: NORMAL
SPECIMEN: NORMAL
STREP A DIRECT SCREEN: NEGATIVE

## 2020-09-08 ENCOUNTER — HOSPITAL ENCOUNTER (EMERGENCY)
Age: 35
Discharge: HOME | End: 2020-09-08
Attending: EMERGENCY MEDICINE
Payer: COMMERCIAL

## 2020-09-08 VITALS
DIASTOLIC BLOOD PRESSURE: 78 MMHG | BODY MASS INDEX: 18.61 KG/M2 | RESPIRATION RATE: 13 BRPM | TEMPERATURE: 98.6 F | OXYGEN SATURATION: 94 % | WEIGHT: 105 LBS | HEART RATE: 93 BPM | SYSTOLIC BLOOD PRESSURE: 105 MMHG | HEIGHT: 63 IN

## 2020-09-08 LAB
ACETAMINOPHEN LEVEL: <5 UG/ML (ref 15–30)
ALBUMIN SERPL-MCNC: 4.6 GM/DL (ref 3.4–5)
ALCOHOL SCREEN SERUM: <0.01 %WT/VOL
ALP BLD-CCNC: 104 IU/L (ref 40–129)
ALT SERPL-CCNC: 14 U/L (ref 10–40)
AMPHETAMINES: NEGATIVE
ANION GAP SERPL CALCULATED.3IONS-SCNC: 16 MMOL/L (ref 4–16)
AST SERPL-CCNC: 26 IU/L (ref 15–37)
BACTERIA: ABNORMAL /HPF
BARBITURATE SCREEN URINE: NEGATIVE
BASOPHILS ABSOLUTE: 0.1 K/CU MM
BASOPHILS RELATIVE PERCENT: 0.4 % (ref 0–1)
BENZODIAZEPINE SCREEN, URINE: NEGATIVE
BILIRUB SERPL-MCNC: 0.2 MG/DL (ref 0–1)
BILIRUBIN URINE: NEGATIVE MG/DL
BLOOD, URINE: ABNORMAL
BUN BLDV-MCNC: 18 MG/DL (ref 6–23)
CALCIUM OXALATE CRYSTALS: ABNORMAL /HPF
CALCIUM SERPL-MCNC: 9.5 MG/DL (ref 8.3–10.6)
CANNABINOID SCREEN URINE: ABNORMAL
CHLORIDE BLD-SCNC: 103 MMOL/L (ref 99–110)
CLARITY: ABNORMAL
CO2: 21 MMOL/L (ref 21–32)
COCAINE METABOLITE: ABNORMAL
COLOR: YELLOW
CREAT SERPL-MCNC: 1.4 MG/DL (ref 0.6–1.1)
DIFFERENTIAL TYPE: ABNORMAL
DOSE AMOUNT: ABNORMAL
DOSE AMOUNT: ABNORMAL
DOSE TIME: ABNORMAL
DOSE TIME: ABNORMAL
EOSINOPHILS ABSOLUTE: 0 K/CU MM
EOSINOPHILS RELATIVE PERCENT: 0.2 % (ref 0–3)
GFR AFRICAN AMERICAN: 51 ML/MIN/1.73M2
GFR NON-AFRICAN AMERICAN: 42 ML/MIN/1.73M2
GLUCOSE BLD-MCNC: 258 MG/DL (ref 70–99)
GLUCOSE, URINE: 50 MG/DL
GONADOTROPIN, CHORIONIC (HCG) QUANT: <0.5 UIU/ML
HCT VFR BLD CALC: 41.2 % (ref 37–47)
HEMOGLOBIN: 14.1 GM/DL (ref 12.5–16)
HYALINE CASTS: 10 /LPF
IMMATURE NEUTROPHIL %: 0.8 % (ref 0–0.43)
KETONES, URINE: NEGATIVE MG/DL
LEUKOCYTE ESTERASE, URINE: ABNORMAL
LYMPHOCYTES ABSOLUTE: 1.5 K/CU MM
LYMPHOCYTES RELATIVE PERCENT: 7.4 % (ref 24–44)
MCH RBC QN AUTO: 32 PG (ref 27–31)
MCHC RBC AUTO-ENTMCNC: 34.2 % (ref 32–36)
MCV RBC AUTO: 93.6 FL (ref 78–100)
MONOCYTES ABSOLUTE: 1.1 K/CU MM
MONOCYTES RELATIVE PERCENT: 5.6 % (ref 0–4)
MUCUS: ABNORMAL HPF
NITRITE URINE, QUANTITATIVE: NEGATIVE
NUCLEATED RBC %: 0 %
OPIATES, URINE: NEGATIVE
OXYCODONE: NEGATIVE
PDW BLD-RTO: 12 % (ref 11.7–14.9)
PH, URINE: 6 (ref 5–8)
PHENCYCLIDINE, URINE: NEGATIVE
PLATELET # BLD: 390 K/CU MM (ref 140–440)
PMV BLD AUTO: 11.6 FL (ref 7.5–11.1)
POTASSIUM SERPL-SCNC: 3.2 MMOL/L (ref 3.5–5.1)
PROTEIN UA: 100 MG/DL
RBC # BLD: 4.4 M/CU MM (ref 4.2–5.4)
RBC URINE: 30 /HPF (ref 0–6)
SALICYLATE LEVEL: 1.8 MG/DL (ref 15–30)
SEGMENTED NEUTROPHILS ABSOLUTE COUNT: 17 K/CU MM
SEGMENTED NEUTROPHILS RELATIVE PERCENT: 85.6 % (ref 36–66)
SODIUM BLD-SCNC: 140 MMOL/L (ref 135–145)
SPECIFIC GRAVITY UA: 1.02 (ref 1–1.03)
SQUAMOUS EPITHELIAL: 34 /HPF
TOTAL IMMATURE NEUTOROPHIL: 0.16 K/CU MM
TOTAL NUCLEATED RBC: 0 K/CU MM
TOTAL PROTEIN: 7 GM/DL (ref 6.4–8.2)
TRICHOMONAS: ABNORMAL /HPF
UROBILINOGEN, URINE: NORMAL MG/DL (ref 0.2–1)
WBC # BLD: 19.8 K/CU MM (ref 4–10.5)
WBC UA: 3 /HPF (ref 0–5)

## 2020-09-08 PROCEDURE — 81001 URINALYSIS AUTO W/SCOPE: CPT

## 2020-09-08 PROCEDURE — 80307 DRUG TEST PRSMV CHEM ANLYZR: CPT

## 2020-09-08 PROCEDURE — 80053 COMPREHEN METABOLIC PANEL: CPT

## 2020-09-08 PROCEDURE — 85025 COMPLETE CBC W/AUTO DIFF WBC: CPT

## 2020-09-08 PROCEDURE — 94761 N-INVAS EAR/PLS OXIMETRY MLT: CPT

## 2020-09-08 PROCEDURE — 84702 CHORIONIC GONADOTROPIN TEST: CPT

## 2020-09-08 PROCEDURE — G0480 DRUG TEST DEF 1-7 CLASSES: HCPCS

## 2020-09-08 PROCEDURE — 99284 EMERGENCY DEPT VISIT MOD MDM: CPT

## 2020-09-08 NOTE — ED TRIAGE NOTES
Patient found walking 8 blocks from her home just wearing her underwear. Also has her 1year old daughter with her, also not wearing underwear. States she feels like she is being shocked. States no one will help her.

## 2020-09-08 NOTE — ED PROVIDER NOTES
09/08/20autumn Greenberg was checked out to me by Dr. Julieta Jeffrey. Please see his/her initial documentation for details of the patient's ED presentation, physical exam and completed studies.     In brief, Roxy Greenberg is a 38 y.o. female that presents with with psychosis awaiting eval.    I have reviewed and interpreted all of the currently available lab results from this visit (if applicable):  Results for orders placed or performed during the hospital encounter of 09/08/20   CBC Auto Differential   Result Value Ref Range    WBC 19.8 (H) 4.0 - 10.5 K/CU MM    RBC 4.40 4.2 - 5.4 M/CU MM    Hemoglobin 14.1 12.5 - 16.0 GM/DL    Hematocrit 41.2 37 - 47 %    MCV 93.6 78 - 100 FL    MCH 32.0 (H) 27 - 31 PG    MCHC 34.2 32.0 - 36.0 %    RDW 12.0 11.7 - 14.9 %    Platelets 463 214 - 625 K/CU MM    MPV 11.6 (H) 7.5 - 11.1 FL    Differential Type AUTOMATED DIFFERENTIAL     Segs Relative 85.6 (H) 36 - 66 %    Lymphocytes % 7.4 (L) 24 - 44 %    Monocytes % 5.6 (H) 0 - 4 %    Eosinophils % 0.2 0 - 3 %    Basophils % 0.4 0 - 1 %    Segs Absolute 17.0 K/CU MM    Lymphocytes Absolute 1.5 K/CU MM    Monocytes Absolute 1.1 K/CU MM    Eosinophils Absolute 0.0 K/CU MM    Basophils Absolute 0.1 K/CU MM    Nucleated RBC % 0.0 %    Total Nucleated RBC 0.0 K/CU MM    Total Immature Neutrophil 0.16 K/CU MM    Immature Neutrophil % 0.8 (H) 0 - 0.43 %   Comprehensive Metabolic Panel   Result Value Ref Range    Sodium 140 135 - 145 MMOL/L    Potassium 3.2 (L) 3.5 - 5.1 MMOL/L    Chloride 103 99 - 110 mMol/L    CO2 21 21 - 32 MMOL/L    BUN 18 6 - 23 MG/DL    CREATININE 1.4 (H) 0.6 - 1.1 MG/DL    Glucose 258 (H) 70 - 99 MG/DL    Calcium 9.5 8.3 - 10.6 MG/DL    Alb 4.6 3.4 - 5.0 GM/DL    Total Protein 7.0 6.4 - 8.2 GM/DL    Total Bilirubin 0.2 0.0 - 1.0 MG/DL    ALT 14 10 - 40 U/L    AST 26 15 - 37 IU/L    Alkaline Phosphatase 104 40 - 129 IU/L    GFR Non- 42 (L) >60 mL/min/1.73m2    GFR  51 (L) >60 mL/min/1.73m2    Anion Gap 16 4 - 16   HCG, Quantitative, Pregnancy   Result Value Ref Range    hCG Quant <0.5 UIU/ML   Ethanol   Result Value Ref Range    Alcohol Scrn <0.01 <2.18 %WT/VOL   Salicylate   Result Value Ref Range    Salicylate Lvl 1.8 (L) 15 - 30 MG/DL    DOSE AMOUNT DOSE AMT. GIVEN - UNKNOWN     DOSE TIME DOSE TIME GIVEN - UNKNOWN    Acetaminophen Level   Result Value Ref Range    Acetaminophen Level <5.0 (L) 15 - 30 ug/ml    DOSE AMOUNT DOSE AMT. GIVEN - UNKNOWN     DOSE TIME DOSE TIME GIVEN - UNKNOWN    Urine Drug Screen   Result Value Ref Range    Cannabinoid Scrn, Ur UNCONFIRMED POSITIVE (A) NEGATIVE    Amphetamines NEGATIVE NEGATIVE    Cocaine Metabolite UNCONFIRMED POSITIVE (A) NEGATIVE    Benzodiazepine Screen, Urine NEGATIVE NEGATIVE    Barbiturate Screen, Ur NEGATIVE NEGATIVE    Opiates, Urine NEGATIVE NEGATIVE    Phencyclidine, Urine NEGATIVE NEGATIVE    Oxycodone NEGATIVE NEGATIVE   Urinalysis   Result Value Ref Range    Color, UA YELLOW YELLOW    Clarity, UA SLIGHTLY CLOUDY (A) CLEAR    Glucose, Urine 50 (A) NEGATIVE MG/DL    Bilirubin Urine NEGATIVE NEGATIVE MG/DL    Ketones, Urine NEGATIVE NEGATIVE MG/DL    Specific Gravity, UA 1.017 1.001 - 1.035    Blood, Urine MODERATE (A) NEGATIVE    pH, Urine 6.0 5.0 - 8.0    Protein,  (A) NEGATIVE MG/DL    Urobilinogen, Urine NORMAL 0.2 - 1.0 MG/DL    Nitrite Urine, Quantitative NEGATIVE NEGATIVE    Leukocyte Esterase, Urine TRACE (A) NEGATIVE    RBC, UA 30 (H) 0 - 6 /HPF    WBC, UA 3 0 - 5 /HPF    Bacteria, UA OCCASIONAL (A) NEGATIVE /HPF    Squam Epithel, UA 34 /HPF    Mucus, UA RARE (A) NEGATIVE HPF    Trichomonas, UA NONE SEEN NONE SEEN /HPF    Hyaline Casts, UA 10 /LPF    Ca Oxalate Ashlyn, UA MANY /HPF       MDM:    Patient presents with psychosis, drug screen positive awaiting eval. patient struck screen is positive awaiting evaluation otherwise stable. Patient observed for several hours she did well did test positive for drugs.   Mental health saw patient cleared her for discharge she is now back to baseline she does admit to using cocaine yesterday likely psychosis from drugs she is not confused now she is not psychosis now she is not suicidal homicidal resting in bed comfortably she is calm and pleasant okay discharged home given return precautions and follow-up and patient stable. Outpatient follow-up. Final Impression:  1. Acute psychosis ([address]    2. Positive urine drug screen    3.  Leukocytosis, unspecified type        (Please note that portions of this note may have been completed with a voice recognition program. Efforts were made to edit the dictations but occasionally words are mis-transcribed.)    Alice Todd, [address] DO  09/08/20 [address] [facility], DO  09/08/20 1605
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
## 2020-12-26 NOTE — ED NOTES
0732 called Mental Health on consult. Spoke with Katerina Leon.      Viviane Garcia  09/08/20 0442
Bed: ED-30  Expected date:   Expected time:   Means of arrival:   Comments:  EMS- uncooperative F     Karmen Singh RN  09/08/20 3041
[facility] [phone] - lives with pt and currently has pts children. Would like to be called with updates.       Scott Feldman RN  09/08/20 4267
Mental Health at bedside     Lyubov Tolbert RN  09/08/20 7204
Patient anxious but cooperative. Continue to monitor.      Gretta Eugene RN  09/08/20 5490
Patient resting quietly with eyes closed. Sitter at bedside.      Lois Thrasher RN  09/08/20 0269
Patient resting quietly. Sitter at bedside.      Silva Rainey RN  09/08/20 8349
Pt family brought belongings       Catarino Lacy [facility]  09/08/20 7174
Pt left without signing AVS paperwork.       Yolande Denny RN  09/08/20 8938
Pt resting in bed with eyes closed. No distress noted. Sitter at bedside.       Vernon Limon RN  09/08/20 6913
Pt resting quietly, no distress noted. Sitter at bedside.       Braulio Molina, RN  09/08/20 8911
Pt resting quietly, no distress noted. Sitter at bedside.       Floyd Forrest, JULIAN  09/08/20 9772
Pt resting with eyes closed, sitter at bedside. No distress noted at this time.      Jennifer Cullen RN  09/08/20 3077
Pt resting with eyes closed. Sitter at bedside. No distress noted at this time.       Adelso Randle RN  09/08/20 6139
Report received from Jose Chavis. Sitter at bedside.       Magali Perry RN  09/08/20 1600
Report received from [facility]. Care assumed at this time. Pt resting quietly in bed. Sitter at bedside. No distress noted.       Carlos Brown RN  09/08/20 4699
Resting with no complaints.      Andres Vasquez RN  09/08/20 6404
This RN called the lab to check on ETA on UA. Lab was unable to find the specimen but has now located it and will run the specimen.      Anuradha Craft RN  09/08/20 6534
This tech allowed the patient to use a phone to try and reach whoever was caring for her children, the patient tried to call twice with no answer. I told her we could try again in a little bit that they may be sleeping.  Patient is now resting with her eyes closed      Memo Zamorano  09/08/20 8490
This tech allowed the patients to call her work and tell them that she wouldn't be in today since she is still being seen in the ED. The patient also asked to call the person who has her children and this tech let her. She became tearful on the phone speaking with her niece and said she is just stressed out.  She calmed down after speaking to her 4yo daughter     Rebekah Patel  09/08/20 2706
This tech removed patients IV      Latha Adams  09/08/20 1944
[place] - mother - [phone]     Venus Darling RN  09/08/20 1221
no

## 2021-08-11 ENCOUNTER — HOSPITAL ENCOUNTER (EMERGENCY)
Age: 36
Discharge: HOME | End: 2021-08-11
Attending: EMERGENCY MEDICINE
Payer: COMMERCIAL

## 2021-08-11 VITALS
SYSTOLIC BLOOD PRESSURE: 130 MMHG | HEART RATE: 86 BPM | RESPIRATION RATE: 18 BRPM | WEIGHT: 105 LBS | HEIGHT: 63 IN | TEMPERATURE: 97.9 F | OXYGEN SATURATION: 99 % | BODY MASS INDEX: 18.61 KG/M2 | DIASTOLIC BLOOD PRESSURE: 77 MMHG

## 2021-08-11 DIAGNOSIS — F19.10 POLYSUBSTANCE ABUSE (HCC): Primary | ICD-10-CM

## 2021-08-11 DIAGNOSIS — R44.3 HALLUCINATIONS: ICD-10-CM

## 2021-08-11 LAB
ACETAMINOPHEN LEVEL: <5 UG/ML (ref 15–30)
ALBUMIN SERPL-MCNC: 4.8 GM/DL (ref 3.4–5)
ALCOHOL SCREEN SERUM: <0.01 %WT/VOL
ALP BLD-CCNC: 98 IU/L (ref 40–129)
ALT SERPL-CCNC: 16 U/L (ref 10–40)
AMPHETAMINES: NEGATIVE
ANION GAP SERPL CALCULATED.3IONS-SCNC: 12 MMOL/L (ref 4–16)
AST SERPL-CCNC: 23 IU/L (ref 15–37)
BARBITURATE SCREEN URINE: NEGATIVE
BASOPHILS ABSOLUTE: 0.1 K/CU MM
BASOPHILS RELATIVE PERCENT: 0.5 % (ref 0–1)
BENZODIAZEPINE SCREEN, URINE: NEGATIVE
BILIRUB SERPL-MCNC: 0.1 MG/DL (ref 0–1)
BUN BLDV-MCNC: 17 MG/DL (ref 6–23)
CALCIUM SERPL-MCNC: 8.9 MG/DL (ref 8.3–10.6)
CANNABINOID SCREEN URINE: ABNORMAL
CHLORIDE BLD-SCNC: 101 MMOL/L (ref 99–110)
CO2: 25 MMOL/L (ref 21–32)
COCAINE METABOLITE: ABNORMAL
CREAT SERPL-MCNC: 1.1 MG/DL (ref 0.6–1.1)
DIFFERENTIAL TYPE: ABNORMAL
DOSE AMOUNT: ABNORMAL
DOSE AMOUNT: ABNORMAL
DOSE TIME: ABNORMAL
DOSE TIME: ABNORMAL
EOSINOPHILS ABSOLUTE: 0.1 K/CU MM
EOSINOPHILS RELATIVE PERCENT: 0.4 % (ref 0–3)
GFR AFRICAN AMERICAN: >60 ML/MIN/1.73M2
GFR NON-AFRICAN AMERICAN: 56 ML/MIN/1.73M2
GLUCOSE BLD-MCNC: 173 MG/DL (ref 70–99)
HCG QUALITATIVE: NEGATIVE
HCT VFR BLD CALC: 41.2 % (ref 37–47)
HEMOGLOBIN: 13.6 GM/DL (ref 12.5–16)
IMMATURE NEUTROPHIL %: 0.5 % (ref 0–0.43)
LYMPHOCYTES ABSOLUTE: 1.7 K/CU MM
LYMPHOCYTES RELATIVE PERCENT: 10.4 % (ref 24–44)
MCH RBC QN AUTO: 31.9 PG (ref 27–31)
MCHC RBC AUTO-ENTMCNC: 33 % (ref 32–36)
MCV RBC AUTO: 96.5 FL (ref 78–100)
MONOCYTES ABSOLUTE: 1.2 K/CU MM
MONOCYTES RELATIVE PERCENT: 7.3 % (ref 0–4)
NUCLEATED RBC %: 0 %
OPIATES, URINE: NEGATIVE
OXYCODONE: NEGATIVE
PDW BLD-RTO: 12.4 % (ref 11.7–14.9)
PHENCYCLIDINE, URINE: NEGATIVE
PLATELET # BLD: 305 K/CU MM (ref 140–440)
PMV BLD AUTO: 11.1 FL (ref 7.5–11.1)
POTASSIUM SERPL-SCNC: 3.2 MMOL/L (ref 3.5–5.1)
RBC # BLD: 4.27 M/CU MM (ref 4.2–5.4)
SALICYLATE LEVEL: <0.3 MG/DL (ref 15–30)
SEGMENTED NEUTROPHILS ABSOLUTE COUNT: 12.8 K/CU MM
SEGMENTED NEUTROPHILS RELATIVE PERCENT: 80.9 % (ref 36–66)
SODIUM BLD-SCNC: 138 MMOL/L (ref 135–145)
TOTAL IMMATURE NEUTOROPHIL: 0.08 K/CU MM
TOTAL NUCLEATED RBC: 0 K/CU MM
TOTAL PROTEIN: 7.1 GM/DL (ref 6.4–8.2)
TSH HIGH SENSITIVITY: 3 UIU/ML (ref 0.27–4.2)
WBC # BLD: 15.8 K/CU MM (ref 4–10.5)

## 2021-08-11 PROCEDURE — G0480 DRUG TEST DEF 1-7 CLASSES: HCPCS

## 2021-08-11 PROCEDURE — 80053 COMPREHEN METABOLIC PANEL: CPT

## 2021-08-11 PROCEDURE — 80307 DRUG TEST PRSMV CHEM ANLYZR: CPT

## 2021-08-11 PROCEDURE — 87635 SARS-COV-2 COVID-19 AMP PRB: CPT

## 2021-08-11 PROCEDURE — 84703 CHORIONIC GONADOTROPIN ASSAY: CPT

## 2021-08-11 PROCEDURE — 99284 EMERGENCY DEPT VISIT MOD MDM: CPT

## 2021-08-11 PROCEDURE — 6370000000 HC RX 637 (ALT 250 FOR IP): Performed by: EMERGENCY MEDICINE

## 2021-08-11 PROCEDURE — 36415 COLL VENOUS BLD VENIPUNCTURE: CPT

## 2021-08-11 PROCEDURE — 85025 COMPLETE CBC W/AUTO DIFF WBC: CPT

## 2021-08-11 PROCEDURE — 84443 ASSAY THYROID STIM HORMONE: CPT

## 2021-08-11 RX ORDER — POTASSIUM CHLORIDE 20 MEQ/1
40 TABLET, EXTENDED RELEASE ORAL ONCE
Status: COMPLETED | OUTPATIENT
Start: 2021-08-11 | End: 2021-08-11

## 2021-08-11 RX ADMIN — POTASSIUM CHLORIDE 40 MEQ: 20 TABLET, EXTENDED RELEASE ORAL at 03:30
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
## 2022-10-26 ENCOUNTER — HOSPITAL ENCOUNTER (OUTPATIENT)
Dept: PSYCHIATRY | Age: 37
Setting detail: THERAPIES SERIES
Discharge: HOME | End: 2022-10-26
Payer: COMMERCIAL

## 2022-10-26 PROCEDURE — 80305 DRUG TEST PRSMV DIR OPT OBS: CPT

## 2022-10-26 PROCEDURE — 90791 PSYCH DIAGNOSTIC EVALUATION: CPT

## 2022-10-26 ASSESSMENT — ANXIETY QUESTIONNAIRES
GAD7 TOTAL SCORE: 7
2. NOT BEING ABLE TO STOP OR CONTROL WORRYING: 0
1. FEELING NERVOUS, ANXIOUS, OR ON EDGE: 1
7. FEELING AFRAID AS IF SOMETHING AWFUL MIGHT HAPPEN: 0
6. BECOMING EASILY ANNOYED OR IRRITABLE: 3
5. BEING SO RESTLESS THAT IT IS HARD TO SIT STILL: 1
IF YOU CHECKED OFF ANY PROBLEMS ON THIS QUESTIONNAIRE, HOW DIFFICULT HAVE THESE PROBLEMS MADE IT FOR YOU TO DO YOUR WORK, TAKE CARE OF THINGS AT HOME, OR GET ALONG WITH OTHER PEOPLE: NOT DIFFICULT AT ALL
4. TROUBLE RELAXING: 2
3. WORRYING TOO MUCH ABOUT DIFFERENT THINGS: 0

## 2022-10-26 NOTE — PROGRESS NOTES
[address] [place] [address] Urinalysis Laboratory Testing and Medical History Physician Order       Location: [x] [place] [] Yuridia Swenson MD., [address], Medical Director of [facility] Director orders for [address] [place] [address] clinical therapists to collect an urine sample from the below patient,  and medical order for placement in level of care documented on  [facility] 157 scanned order. Client: Deysi Singh   : 1985   Case# [id number]    Urine sample will be collected following the collections guidelines provided on [facility]) custody form, and completion of the [facility] Non-Federal chain of custody drug screening form. During the course of treatment, randomly a urine sample will be collected, at a minimum of one time a month, more frequently as needed, as part of the clinical outpatient alcohol and drug treatment program at [address] [place] [address]. Medical care recommendation for clients experiencing/reporting  medical concerns, who do not have a family physician and willing to attend  medical care will be assisted in seeking medical care. Clinical providers will refer clients to local family physicians practices as part of the  clients treatment plan and assist the client in gaining access to an appointment. Release of information will be requested to support the  clients seeking medical care. Summary of Medical History  Prior to Admission medications    Medication Sig Start Date End Date Taking? Authorizing Provider   sertraline (ZOLOFT) 50 MG tablet Take 50 mg by mouth daily    Historical Provider, MD   ibuprofen (IBU) 600 MG tablet Take 1 tablet by mouth every 6 hours as needed for Pain 18  Thais Mar MD   Prenatal Multivit-Min-Fe-FA (PRENATAL #2 PO) Take by mouth    Historical Provider, MD     History reviewed. No pertinent surgical history.   Past Medical History:   Diagnosis Date    Anxiety     Depression      Patient Active Problem List Diagnosis Date Noted    Labor and delivery indication for care or intervention 09/03/2017       Ivan Nguyen, [facility]-CS  41/91/8943/2:33 PM
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
## 2022-10-28 ENCOUNTER — HOSPITAL ENCOUNTER (OUTPATIENT)
Dept: PSYCHIATRY | Age: 37
Setting detail: THERAPIES SERIES
Discharge: HOME | End: 2022-10-28
Payer: COMMERCIAL

## 2022-10-28 PROCEDURE — H2020 THER BEHAV SVC, PER DIEM: HCPCS

## 2022-10-28 PROCEDURE — 90853 GROUP PSYCHOTHERAPY: CPT

## 2022-10-31 ENCOUNTER — HOSPITAL ENCOUNTER (OUTPATIENT)
Dept: PSYCHIATRY | Age: 37
Setting detail: THERAPIES SERIES
Discharge: HOME | End: 2022-10-31
Payer: COMMERCIAL

## 2022-10-31 PROCEDURE — H2020 THER BEHAV SVC, PER DIEM: HCPCS

## 2022-10-31 PROCEDURE — 90853 GROUP PSYCHOTHERAPY: CPT

## 2022-11-01 ENCOUNTER — HOSPITAL ENCOUNTER (OUTPATIENT)
Dept: PSYCHIATRY | Age: 37
Setting detail: THERAPIES SERIES
Discharge: HOME | End: 2022-11-01
Payer: COMMERCIAL

## 2022-11-01 PROCEDURE — 90834 PSYTX W PT 45 MINUTES: CPT

## 2022-11-01 NOTE — PROGRESS NOTES
[facility]                Progress Note    [x] [place]  [] Carolina Benavidez                    Patient Name: Petty Mcgowan   : 1985     Case # :  [id number]  Therapist: KASHIF Narayanan        Objective/Service/Time:    1-HOUR    S:  Client completed first individual session since beginning IOP. She reports no use of substances. She shared more about her own story and what brought her into treatment at [facility]. She is dealing with shared parenting with her ex- and all the issues that come with that. She stated, \"I'd like to start on some Step work while I'm here in treatment during a sessions. \" I stated that we can do an overview and she can consider looking for a sponsor in the future since she's had a bad experience with sponsors in the past.    O:  Client was cooperative. A:  Client is in the action stage of change. She has a solid support network and will work on completing her Individualized Treatment Plan goals and objectives. \"     P:  Continue in treatment.                   Cordell Linares MA, [facility] 320, , 40:83 PM

## 2022-11-01 NOTE — GROUP NOTE
[facility] Group Therapy Note      10/31/2022    Location:  [place]      Clients Presents: [phone]     Clients Absent: [phone]    Length of session: 3.0 hours    Group Note: [facility]    Group Type: Co-Ed    New members were welcomed and introduced. Norms and expectations of group were discussed. Content: GROUP CHECKED-IN  TOPIC:  \"SELF-ESTEEM\"  Clients explored what self-esteem was and completed a self-esteem worksheet where they identified areas of their best qualities and values. Alexandria Dozier [facility] 320  06/82/6437 98:32 PM        [place] Individual Group Progress Note    Kimberly Bradford  1985 11/1/2022    Notes on Client Progress in Group    Robby Lainez was attentive. She stated the following goal in treatment: To stay sober!  I'm making progress!  The recovery skills I'm using: Socializing, Spending time with family, and Watching TV/Movies!  Her level of Depression-1, Anger-7 Anxiety-1 today. She reports no use and no cravings. She reports her biggest stressor: Daughter's father!     Participated in the group discussion, shared openly, offered insight into identifying her best qualities and values. This exercise helps to increase one's self-esteem. Peers gave feedback and support to each other.        Alexandria Dozier [facility] 320  63/6/3656 1:87 PM    Co-Therapist: N/A

## 2022-11-02 ENCOUNTER — HOSPITAL ENCOUNTER (OUTPATIENT)
Dept: PSYCHIATRY | Age: 37
Setting detail: THERAPIES SERIES
Discharge: HOME | End: 2022-11-02
Payer: COMMERCIAL

## 2022-11-02 ENCOUNTER — APPOINTMENT (OUTPATIENT)
Dept: PSYCHIATRY | Age: 37
End: 2022-11-02
Payer: COMMERCIAL

## 2022-11-02 PROCEDURE — H2020 THER BEHAV SVC, PER DIEM: HCPCS

## 2022-11-04 ENCOUNTER — HOSPITAL ENCOUNTER (OUTPATIENT)
Dept: PSYCHIATRY | Age: 37
Setting detail: THERAPIES SERIES
Discharge: HOME | End: 2022-11-04
Payer: COMMERCIAL

## 2022-11-07 ENCOUNTER — HOSPITAL ENCOUNTER (OUTPATIENT)
Dept: PSYCHIATRY | Age: 37
Setting detail: THERAPIES SERIES
Discharge: HOME | End: 2022-11-07
Payer: COMMERCIAL

## 2022-11-07 PROCEDURE — H2020 THER BEHAV SVC, PER DIEM: HCPCS

## 2022-11-07 NOTE — GROUP NOTE
[facility] Group Therapy Note      11/7/2022    Location:  [place]      Clients Presents: [phone]     Clients Absent: [phone]    Length of session: 3.0 hours    Group Note: IOP    Group Type: Co-Ed    New members were welcomed and introduced. Norms and expectations of group were discussed. Content: GROUP CHECKED-IN  TOPIC:  \"Planning For [employer]  Clients learned ways in which sobriety and relapse are processes. Clients also learned that relapse is a part of a decision process.     Mitchell Dozier  87/4/1685 02:99 PM        2 [facility] Individual Group Progress Note    Didi Beach  1985 11/7/2022    Notes on Client Progress in Group    Reason for Absence: DNS     Mitchell Dozier  76/5/3193 2:52 PM    Co-Therapist: N/A

## 2022-11-07 NOTE — GROUP NOTE
[facility] Group Therapy Note      11/7/2022    Location:  [place]      Clients Presents: [phone]    Clients Absent: [phone]    Length of session: 3.0 hours    Group Note: IOP    Group Type: Co-Ed    New members were welcomed and introduced. Norms and expectations of group were discussed. Content: GROUP CHECKED-IN  TOPIC:  \"Planning For Sobriety\" Part II Packet  Clients examined different types of relapse warning signs  and reviewed components of a full recovery program. Going through the packet provided clients with an important skill: Planning for their own Recovery. Mitchell Dozier  16/9/5658 8:23 PM        [employer] Individual Group Progress Note    Lauren Stephany  1985 11/7/2022    Notes on Client Progress in Group    Say Mitchell was attentive. She stated the following goal in treatment: Long term sobreity!  I'm making progress!  The recovery skills I'm using: Socializing and Spending time with family!  Her level of Depression-1, Anger-1 Anxiety-1 today. She reports no use and no cravings. She reports her biggest stressor: Daughter's father!     Participated in the group discussion, shared openly, offered insight in understanding her own relapse warning signs and the importance of working a full recovery program (attending 12-Step meetings, sponsor contact, and reading literature etc). Peers gave feedback and support to each other.       Mitchell Dozeir  85/9/4121 6:24 PM    Co-Therapist: N/A

## 2022-11-08 ENCOUNTER — HOSPITAL ENCOUNTER (OUTPATIENT)
Dept: PSYCHIATRY | Age: 37
Setting detail: THERAPIES SERIES
Discharge: HOME | End: 2022-11-08
Payer: COMMERCIAL

## 2022-11-08 PROCEDURE — 90834 PSYTX W PT 45 MINUTES: CPT

## 2022-11-08 NOTE — PROGRESS NOTES
[facility]                Progress Note    [x] [place]  [] Peyman Rios                    Patient Name: Anjali Loyd   : 1985     Case # :  [id number]  Therapist: KASHIF Teague        Objective/Service/Time:    1-HOUR    GOAL     OBJECTIVE           S:  Client completed individual session. She reports no use of substances. She stated, \"I was able to have a long conversation with my boyfriend about our relationship and where he sees us going in the ? \" She shared more details and is excited that it includes him getting his own place and a chance to meet his children. She continues in drug court and will soon move to Phase III which would mean that she can start having her daughter have sleep over at her place over on weekends. Client shared concerns about her best friend who she thinks is in active addiction and is having trouble setting healthy boundaries with her. O:  Client was cooperative. A:  Client is in the action stage of change. Client identified heathy boundaries by: Saying No, Clearly state your feelings, Treat yourself with respect, Stand by your beliefs, Develop trust over time. She has a solid support network and will work on completing her Individualized Treatment Plan goals and objectives. \"     P:  Continue in treatment.                   Bradly Nathan MA, KASHIF, , 57:20 PM

## 2022-11-08 NOTE — PROGRESS NOTES
[facility] TREATMENT PLAN       Location: [x] [place] [] Adeola Andino    Treatment plan:   Initial  1326    Strengths:  Determined, Good mother    Weakness/Limitations:  Lacks personal boundaries    Service/Frequency/Duration: Individual 1-Week in 90 Days, IOP Mon/Wed/Fri from 9-12 in 90 Days, Urinalysis Random monthly in 90 Days, AA/NA 2-4 Weekly in 90 Days, Sponsor Contact Weekly in 90 Days and Case Management as Needed in 90 Days    Diagnosis:   F10.20 Other and unspecified alcohol dependence/unspecified drinking behavior,   F14.20 Cocaine dependence-unspecified use, F12.20 Cannabis dependence-unspecified use    Level of Care: 2.1 Intensive Outpatient Services    Problem: Transfer from [facility] for continue IOP Treatment/History of Substance use  Goal: Abstain from using drugs/alcohol in 90 Days   Objectives:   1) Identify 3 Indicators of Addiction in 90 Days. Evaluation Date: 2/8/22  Code: C Continue TBD   2)  Identify 3 Negative Effects of Substance Use in 90 Days. Evaluation Date:  2/8/22 Code: C Continue TBD     iii  3)  Identify 3 benefits of maintaining sobriety  could positively impact your in 90 day Evaluation Date:  2/8/22 Code: C continue TBD    2. Problem: Lacks Coping Skills to Maintain Long-term Sobriety   Goal: Acquire necessary skills to maintain sobriety in 90 Days   Objectives:   1)  Identify 3 Changes that you will make that support your recovery in 90 Days Evaluation Date:  2/8/22 Code: C Continue TBD   2) Identify 3 External/Internal Triggers and Sober Responses to them in 90 Days Evaluation Date: 2/8/22 Code: C Continue TBD   3) 1x Month contact with [facility]  for support in 90 Days Evaluation Date: 2/8/22 Code: C Continue TBD     3.     Problem: Lacks Understanding and Knowledge of Addiction   Goal: Develop increased awareness of the Disease of Addiction and Relapse triggers and coping strategies needed to effectively deal with them that support long-term sobriety in 90 Days  Objectives:   1)  Identify 3 Ways to achieve a quality of life that is free from using all MAS on a continuing bases in 90 Days Evaluation Date: 2/8/22 Code: C Continue TBD  2)  Identify 4 Strategies to manage urges to lapse back into substance use in 90 Days Evaluation Date:   2/8/22 Code: C Continue TBD  3) Identify 4 Stages of Recovery in 90 Days Evaluation Date:  2/8/22 Code: C Continue TBD     Defer:  Continue with P    Discharge Plan/Instructions: Complete treatment plan goals and objective and maintain sobriety. Joycelyn Zapien / 1985 has participated in the treatment plan development outlined above on 11/8/2022.      KASHIF Dozier  26/5/3523/85:74 PM

## 2022-11-09 ENCOUNTER — HOSPITAL ENCOUNTER (OUTPATIENT)
Dept: PSYCHIATRY | Age: 37
Setting detail: THERAPIES SERIES
Discharge: HOME | End: 2022-11-09
Payer: COMMERCIAL

## 2022-11-09 PROCEDURE — 80305 DRUG TEST PRSMV DIR OPT OBS: CPT

## 2022-11-09 PROCEDURE — H2020 THER BEHAV SVC, PER DIEM: HCPCS

## 2022-11-11 ENCOUNTER — HOSPITAL ENCOUNTER (OUTPATIENT)
Dept: PSYCHIATRY | Age: 37
Setting detail: THERAPIES SERIES
Discharge: HOME | End: 2022-11-11
Payer: COMMERCIAL

## 2022-11-11 PROCEDURE — 90853 GROUP PSYCHOTHERAPY: CPT

## 2022-11-14 ENCOUNTER — HOSPITAL ENCOUNTER (OUTPATIENT)
Dept: PSYCHIATRY | Age: 37
Setting detail: THERAPIES SERIES
Discharge: HOME | End: 2022-11-14
Payer: COMMERCIAL

## 2022-11-14 PROCEDURE — H2020 THER BEHAV SVC, PER DIEM: HCPCS

## 2022-11-14 NOTE — GROUP NOTE
[facility] Group Therapy Note      11/11/2022    Location:  [place]      Clients Presents: [phone]    Clients Absent: [phone]    Length of session: 3.0 hours    Group Note: IOP    Group Type: Co-Ed    New members were welcomed and introduced. Norms and expectations of group were discussed. Content: GROUP CHECKED-IN  TOPIC:  \"Relapse Prevention Plan\" Packet  Clients completed an Relapse Prevention Plan to halt the relapse process by identifying warning signs of  triggers, coping strategies to handle cravings, attending 12-Step Meetings, Sponsorship, Consequences of Relapse, and Benefits of Recovery. They also shared short-term goals (1-12 months) and long-term goals (1-3 years). [place], [place]  84/71/4345 28:58 PM        [facility] Individual Group Progress Note    Shy Blackburn  1985 11/14/2022    Notes on Client Progress in Group    Magali Pérez was attentive. She stated the following goal in treatment: Long-term Sobriety!  I'm making progress!  The recovery skills I'm using: Watching TV/Movies and Spending time with family!  Her level of Depression-1, Anger-1 Anxiety-1 today. She reports no use and no cravings. She reports her biggest stressor: My ex-!     Participated in the group discussion, shared openly, offered insight in developing and writing her own relapse prevention plan. Peers gave feedback and support to each other.        [place], [place]  94/42/5480 07:26 PM    Co-Therapist: N/A

## 2022-11-14 NOTE — GROUP NOTE
[facility] Group Therapy Note      11/11/2022    Location:  [place]      Clients Presents: [phone]    Clients Absent: [phone]     Length of session: 3.0 hours    Group Note: IOP    Group Type: Co-Ed    New members were welcomed and introduced. Norms and expectations of group were discussed. Content: GROUP CHECKED-IN  TOPIC:  \"Cross Addiction\" DVD-Worksheet and Discussion Hand-out  Clients watched DVD and discussed its content about cross addiction. Clients completed worksheet and answered questions regarding the content of the movie. Clients also read and discussed hand-out about \"The Facts Of [employer] Addiction. \"    Aram Goldmann, [place]  60/11/3609 93:32 PM        [facility] Individual [facility] Progress Note    Dakota Rosas  1985 11/14/2022    Notes on Client Progress in Group    Arash Burciaga was attentive. She stated the following goal in treatment: Long-term Sobriety!  I'm making progress!  The recovery skills I'm using: Watching TV/Movies and Spending time with family!  Her level of Depression-1, Anger-1 Anxiety-1 today. She reports no use and no cravings. She reports her biggest stressor: None Currently!     Participated in the group discussion, shared openly, offered insight in understanding Cross Addiction and completed treatment worksheet. Peers gave feedback and support to each other.        Aram Goldmann, [place]  55/62/3437 03:54 AM    Co-Therapist: N/A

## 2022-11-14 NOTE — GROUP NOTE
[facility] Group Therapy Note      11/14/2022    Location:  [place]      Clients Presents: [phone]    Clients Absent: [phone]    Length of session: 1.5 hours    Group Note: [facility]    Group Type: Co-Ed    New members were welcomed and introduced. Norms and expectations of group were discussed. Content: GROUP CHECKED-IN  TOPIC:  \"The Outside-the box Recovery Workbook - Relapse Prevention Plan Part I\"  Clients began working on a new workbook which requires discussion questions while coloring. Clients where able to identify: Warning signs, triggers, reasons for staying in recovery and benefits of staying clean and sober? Mitchell Dozier  23/85/6686 35:45 AM      [facility] Individual Group Progress Note    Shy Blackburn  1985 11/14/2022    Notes on Client Progress in Group     Magali Pérez was attentive. She stated the following goal in treatment: Long-term Sobriety!  I'm making progress!  The recovery skills I'm using: Watching TV/Movies and Spending time with family!  Her level of Depression-1, Anger-1 Anxiety-1 today. She reports no use and no cravings. She reports her biggest stressor: Life is good!     Participated in the group discussion, shared openly, offered insight in understanding his own relapse warning signs, triggers, reasons for staying in recovery, and benefits of staying clean and sober as she colored in the new Outside-the-Box Recovery Workbook Sheets. Peers gave feedback and support to each other.       Mitchell Dozier  66/04/6717 23:11 PM    Co-Therapist: N/A

## 2022-11-15 ENCOUNTER — APPOINTMENT (OUTPATIENT)
Dept: PSYCHIATRY | Age: 37
End: 2022-11-15
Payer: COMMERCIAL

## 2022-11-15 ENCOUNTER — HOSPITAL ENCOUNTER (OUTPATIENT)
Dept: PSYCHIATRY | Age: 37
Setting detail: THERAPIES SERIES
End: 2022-11-15
Payer: COMMERCIAL

## 2022-11-16 ENCOUNTER — HOSPITAL ENCOUNTER (OUTPATIENT)
Dept: PSYCHIATRY | Age: 37
Setting detail: THERAPIES SERIES
Discharge: HOME | End: 2022-11-16
Payer: COMMERCIAL

## 2022-11-18 ENCOUNTER — HOSPITAL ENCOUNTER (OUTPATIENT)
Dept: PSYCHIATRY | Age: 37
Setting detail: THERAPIES SERIES
Discharge: HOME | End: 2022-11-18
Payer: COMMERCIAL

## 2022-11-18 PROCEDURE — 90853 GROUP PSYCHOTHERAPY: CPT

## 2022-11-18 NOTE — GROUP NOTE
[facility] Group Therapy Note      11/18/2022    Location:  [place]      Clients Presents: [phone]    Clients Absent: [phone]    Length of session: 3.0 hours    Group Note: IOP    Group Type: Co-Ed    New members were welcomed and introduced. Norms and expectations of group were discussed. Content: Counselor present a solution focused discussion on Addiction. Client identified signs and symptoms of addiction including Tolerance, withdrawal, loss of control, attempts to control, time spent, sacrifices made and continued use despite negative consequences. Client identified coping skills to avoid relapse. Hetal Johnston, [address]  11/18/2022 12:00 PM    Co-Therapist: N/A      [facility] Individual Group Progress Note    Dm Levon  1985 11/18/2022    Notes on Client Progress in Group    Client shared she is making progress on her goal of staying sober. She is nine months sober and is engaged in 12 step meetings. Client reports she needs to attend more AA meetings. Client shared she has used cocaine for over 10 years and it has caused her to have psychosis. She reports her brain is healing and getting better with memory. Client uses hobbies and support to remain sober.      Hetal Johnston, [address]  11/18/2022 10:43 AM    Co-Therapist: N/A

## 2022-11-21 ENCOUNTER — HOSPITAL ENCOUNTER (OUTPATIENT)
Dept: PSYCHIATRY | Age: 37
Setting detail: THERAPIES SERIES
Discharge: HOME | End: 2022-11-21
Payer: COMMERCIAL

## 2022-11-21 PROCEDURE — H2020 THER BEHAV SVC, PER DIEM: HCPCS

## 2022-11-22 ENCOUNTER — APPOINTMENT (OUTPATIENT)
Dept: PSYCHIATRY | Age: 37
End: 2022-11-22
Payer: COMMERCIAL

## 2022-11-22 ENCOUNTER — HOSPITAL ENCOUNTER (OUTPATIENT)
Dept: PSYCHIATRY | Age: 37
Setting detail: THERAPIES SERIES
Discharge: HOME | End: 2022-11-22
Payer: COMMERCIAL

## 2022-11-22 PROCEDURE — 90834 PSYTX W PT 45 MINUTES: CPT

## 2022-11-22 NOTE — GROUP NOTE
[facility] Group Therapy Note      11/21/2022    Location:  [place]      Clients Presents: [phone]    Clients Absent: [phone]    Length of session: 3.0 hours    Group Note: IOP    Group Type: Co-Ed    New members were welcomed and introduced. Norms and expectations of group were discussed. Content: GROUP CHECKED-IN  TOPIC:  \"Serenity Prayer\" - \"What Do I Have Control Over? \" - \"Wants, Needs, and Addiction\"  Clients read and discussed the Serenity Prayer and what it meant to them. They completed treatment worksheets where they identified their needs and wants along with what they have and don't have control over.     Mitchell Dozier  34/40/8625 1:66 AM        [facility] Individual Group Progress Note    Keny Thompson  1985 11/22/2022    Notes on Client Progress in Group    Reason for Absence: CX-ick     [place], [place]  01/98/4953 2:88 AM    Co-Therapist: N/A

## 2022-11-22 NOTE — GROUP NOTE
[facility] Therapy Note      11/21/2022    Location:  [place]      Clients Presents: [phone]    Clients Absent: [phone]    Length of session: 3.0 hours    Group Note: IOP    Group Type: Co-Ed    New members were welcomed and introduced. Norms and expectations of group were discussed. Content: GROUP CHECKED-IN  TOPIC:  \"Road to Recovery: Relapse Protection\" - \"Relapse Prevention Planning\"  Clients explored why addiction is a chronic disease disorder, recognizing triggers, relapse warning signs, and the Stages of Relapse (Emotional, Mental, Physical). Mitchell Perez  65/00/8056 0:12 PM        [employer] Individual Group Progress Note    Deysi Singh  1985 11/22/2022    Notes on Client Progress in Group    Jessie Martinez was attentive. She stated the following goal in treatment: Long-term Sobriety!  I'm making progress!  The recovery skills I'm using: Watching TV/Movies and Spending time with family!  Her level of Depression-1, Anger-1 Anxiety-1 today. She reports no use and no cravings. She reports no stressors today.     Participated in the group discussion, shared openly, offered insight in understanding the three stages of relapse and how she is preventing relapse. Peers gave feedback and support to each other.       Mitchell Perez  89/14/9703 71:57 AM    Co-Therapist: N/A

## 2022-11-22 NOTE — PROGRESS NOTES
[address] [facility]                Progress Note    [x] [place]  [] [facility]                    Patient Name: Kam Dumont   : 1985     Case # :  [id number]  Therapist: Corliss Cooks [place], [facility]        Objective/Service/Time:    1-HOUR     GOAL     OBJECTIVE             S:  Client completed individual session. She reports no use of substances. She stated, \"I  think I need to break up my bf?\" Client shared the reasons why she is seriously going to give her bf an automatium      O:  Client was cooperative. A:  Client is in the action stage of change. Client identified heathy boundaries by: Saying No, Clearly state your feelings, Treat yourself with respect, Stand by your beliefs, Develop trust over time. She has a solid support network and will work on completing her Individualized Treatment Plan goals and objectives. \"     P:  Continue in treatment.                   Jordan Carlson, [place] [facility] 320, , 84:56 PM

## 2022-11-23 ENCOUNTER — HOSPITAL ENCOUNTER (OUTPATIENT)
Dept: PSYCHIATRY | Age: 37
Setting detail: THERAPIES SERIES
Discharge: HOME | End: 2022-11-23
Payer: COMMERCIAL

## 2022-11-23 PROCEDURE — H2020 THER BEHAV SVC, PER DIEM: HCPCS

## 2022-11-28 ENCOUNTER — HOSPITAL ENCOUNTER (OUTPATIENT)
Dept: PSYCHIATRY | Age: 37
Setting detail: THERAPIES SERIES
Discharge: HOME | End: 2022-11-28
Payer: COMMERCIAL

## 2022-11-28 NOTE — GROUP NOTE
[facility] Group Therapy Note      11/28/2022    Location:  [place]      Clients Presents: [phone]    Clients Absent: [phone]    Length of session: 3.0 hours    Group Note: IOP    Group Type: Co-Ed    New members were welcomed and introduced. Norms and expectations of group were discussed. Content: GROUP CHECKED-IN  TOPIC:  \"Identifying External and Internal Cues and Triggers\" - \"Think Your Way Out Of Using\"  Clients identified their top three most strongly cues/triggers associated with substance use. Clients read, discussed how to think their way out of using, and completed treatment worksheet.     [place], [place]  12/69/9760 7:85 PM        [facility]    Dm Dos Santos  1985 11/28/2022    Notes on Client Progress in Group    Reason for Absence: DNS - Letter of Intent Sent    [place], [place]  11/29/2193 4:10 PM    Co-Therapist: N/A

## 2022-11-28 NOTE — GROUP NOTE
[facility] Group Therapy Note      11/28/2022    Location:  [place]      Clients Presents: [phone]    Clients Absent: [phone]    Length of session: 3.0 hours    Group Note: [facility]    Group Type: Co-Ed    New members were welcomed and introduced. Norms and expectations of group were discussed. Content: GROUP CHECKED-IN  TOPIC:  \"12 Tips to Staying Clean and Sober during the [place]" - \"5 [place] of Appropriately Managing Feelings\"  Clients read and discussed strategies of staying clean and sober during the holidays season. Clients discussed aspects of appropriately managing feelings. Mitchell Dozier  58/48/7657 36:97 PM      [facility] Individual Group Progress Note    Donna Wendy  1985 11/28/2022    Notes on Client Progress in Group    Adebayo Clarkeon was attentive. She stated the following goal in treatment: Long-term Sobriety!  I'm making progress!  The recovery skills I'm using: Watching TV/Movies and Spending time with family!  Her level of Depression-1 Anger-5 Anxiety-1 today. She reports no use and no cravings. She reports no stressors today.     Participated in the group discussion, shared openly, offered insight in understanding the importance of being involved in activities during the holiday season that help to strengthen their recovery. Peers gave feedback and support to each other.       Mitchell Dozier  29/23/3814 7:04 PM    Co-Therapist: N/A

## 2022-11-29 ENCOUNTER — HOSPITAL ENCOUNTER (OUTPATIENT)
Dept: PSYCHIATRY | Age: 37
Setting detail: THERAPIES SERIES
Discharge: HOME | End: 2022-11-29
Payer: COMMERCIAL

## 2022-11-29 ENCOUNTER — APPOINTMENT (OUTPATIENT)
Dept: PSYCHIATRY | Age: 37
End: 2022-11-29
Payer: COMMERCIAL

## 2022-11-29 PROCEDURE — 90834 PSYTX W PT 45 MINUTES: CPT

## 2022-11-29 NOTE — PROGRESS NOTES
[facility] TREATMENT PLAN       Location: [x] [place] [] [place]    Treatment plan:   LTOPYV  6268   (11/29/22)    Strengths:  Determined, Good mother    Weakness/Limitations:  Lacks personal boundaries    Service/Frequency/Duration: Individual 1-Week in 90 Days, IOP Mon/Wed/Fri from 9-12 in 90 Days, Urinalysis Random monthly in 90 Days, AA/NA 2-4 Weekly in 90 Days, Sponsor Contact Weekly in 90 Days and Case Management as Needed in 90 Days    Diagnosis:   F10.20 Other and unspecified alcohol dependence/unspecified drinking behavior,   F14.20 Cocaine dependence-unspecified use, F12.20 Cannabis dependence-unspecified use    Level of Care: 2.1 Intensive Outpatient Services    Problem: Transfer from [facility] for continue IOP Treatment/History of Substance use  Goal: Abstain from using drugs/alcohol in 90 Days   Objectives:   1) Identify 3 Indicators of Addiction in 90 Days. Evaluation Date: 2/8/22  Code: C Continue TBD   2)  Identify 3 Negative Effects of Substance Use in 90 Days. Evaluation Date:  2/8/22 Code: C Continue TBD     iii  3)  Identify 3 benefits of maintaining sobriety  could positively impact your in 90 day Evaluation Date:  2/8/22 Code: C continue TBD    2. Problem: Lacks Coping Skills to Maintain Long-term Sobriety   Goal: Acquire necessary skills to maintain sobriety in 90 Days   Objectives:   1)  Identify 3 Healthy Boundaries that support your recovery in 90 Days Evaluation Date:  2/8/22 Code: Achieved 11/29/22 Saying No, Clearly state your feelings, Treat yourself with respect, Stand by your beliefs, Develop trust over time    2) Identify 3 External/Internal Triggers and Sober Responses to them in 90 Days Evaluation Date: 2/8/22 Code: C Continue TBD   3) 1x Month contact with [facility]  for support in 90 Days Evaluation Date: 2/8/22 Code: C Continue TBD     3.     Problem: Lacks Understanding and Knowledge of Addiction   Goal: Develop increased awareness of the Disease of Addiction and Relapse triggers and coping strategies needed to effectively deal with them that support long-term sobriety in 90 Days  Objectives:   1)  Identify 3 Ways to achieve a quality of life that is free from using all MAS on a continuing bases in 90 Days Evaluation Date: 2/8/22 Code: C Continue TBD  2)  Identify 4 Strategies to manage urges to lapse back into substance use in 90 Days Evaluation Date:   2/8/22 Code: C Continue TBD  3) Identify 4 Stages of Recovery in 90 Days Evaluation Date:  2/8/22 Code: C Continue TBD     Defer:  Continue with P    Discharge Plan/Instructions: Complete treatment plan goals and objective and maintain sobriety. [place] / 1985 has participated in the treatment plan development outlined above on 11/29/2022.      KASHIF Dozier  53/93/9777/8:10 PM

## 2022-11-29 NOTE — PROGRESS NOTES
[facility]                Progress Note    [x] [place]  [] [place]                    Patient Name: Joycelyn Zapien   : 1985     Case # :  [id number]  Therapist: KASHIF Lima        Objective/Service/Time:    1-HOUR     GOAL 2a     OBJECTIVE  1        S:  Client completed individual session. She reports no use of substances. She stated, \"I had a great Thanksgiving at my sister's but Saturday she tested positive for COVID and my daughter who was around her. \" \"I went to Urgent Care and my test came back yesterday for negative and I also had some home test which were negative as well. \"     O:  Client was cooperative. A:  Client is in the action stage of change. Client identified heathy boundaries by: Saying No, Clearly state your feelings, Treat yourself with respect, Stand by your beliefs, Develop trust over time. She has a solid support network and will work on completing her Individualized Treatment Plan goals and objectives. \"     P:  Continue in treatment.                   Prabhu Quesada MA, [place] [facility] 320, , 97:89 AM

## 2022-11-30 ENCOUNTER — HOSPITAL ENCOUNTER (OUTPATIENT)
Dept: PSYCHIATRY | Age: 37
Setting detail: THERAPIES SERIES
Discharge: HOME | End: 2022-11-30
Payer: COMMERCIAL

## 2022-11-30 PROCEDURE — H2020 THER BEHAV SVC, PER DIEM: HCPCS

## 2022-12-02 ENCOUNTER — HOSPITAL ENCOUNTER (OUTPATIENT)
Dept: PSYCHIATRY | Age: 37
Setting detail: THERAPIES SERIES
Discharge: HOME | End: 2022-12-02
Payer: COMMERCIAL

## 2022-12-02 PROCEDURE — H2020 THER BEHAV SVC, PER DIEM: HCPCS

## 2022-12-05 ENCOUNTER — HOSPITAL ENCOUNTER (OUTPATIENT)
Dept: PSYCHIATRY | Age: 37
Setting detail: THERAPIES SERIES
Discharge: HOME | End: 2022-12-05
Payer: COMMERCIAL

## 2022-12-05 PROCEDURE — H2020 THER BEHAV SVC, PER DIEM: HCPCS

## 2022-12-05 PROCEDURE — 80305 DRUG TEST PRSMV DIR OPT OBS: CPT

## 2022-12-06 ENCOUNTER — APPOINTMENT (OUTPATIENT)
Dept: PSYCHIATRY | Age: 37
End: 2022-12-06
Payer: COMMERCIAL

## 2022-12-06 ENCOUNTER — HOSPITAL ENCOUNTER (OUTPATIENT)
Dept: PSYCHIATRY | Age: 37
Setting detail: THERAPIES SERIES
Discharge: HOME | End: 2022-12-06
Payer: COMMERCIAL

## 2022-12-06 PROCEDURE — 90832 PSYTX W PT 30 MINUTES: CPT

## 2022-12-06 NOTE — GROUP NOTE
[facility] Group Therapy Note      12/6/2022    Location:  [place]      Clients Presents: [phone]    Clients Absent: 1341    Length of session: 3.0 hours    Group Note: IOP    Group Type: Co-Ed    New members were welcomed and introduced. Norms and expectations of group were discussed. Content: GROUP CHECKED-IN  TOPIC:  \"Anger Iceberg\" - \"When is Anger a Problem? \" - \"Coping Skills for Anger\"  Clients learned how anger is a secondary emotions which can be fueled by underlined emotions at different times (i.e. Shame, Anxiety, Lonely, Hungry, Grief etc). [place] [facility] 320  75/6/3149 7:32 PM        612 [facility] Individual Group Progress Note    Ginny Enter  1985 12/6/2022    Notes on Client Progress in Group    Noemí Vargas was attentive. She stated the following goal in treatment: Long-term Sobriety!  I'm making progress!  The recovery skills I'm using: Watching TV/Movies and Spending time with family!  Her level of Depression-1 Anger-3 Anxiety-1 today. She reports no use and no cravings. She reported her biggest stressor: Yoandy!     Participated in the group discussion, shared openly, offered insight in understanding where anger comes from and strategies in handling anger which could have negative repercussions if not handled appropriately. Peers gave feedback and support to each other.      [place] [facility] 320  28/7/0131 7:25 PM    Co-Therapist: N/A

## 2022-12-06 NOTE — PROGRESS NOTES
[facility] TREATMENT PLAN       Location: [x] [place] [] [place]    Treatment plan:   UKQVDF  4684   (12/6/22)    Strengths:  Determined, Good mother    Weakness/Limitations:  Lacks personal boundaries    Service/Frequency/Duration: Individual 1-Week in 90 Days, IOP Mon/Wed/Fri from 9-12 in 90 Days, Urinalysis Random monthly in 90 Days, AA/NA 2-4 Weekly in 90 Days, Sponsor Contact Weekly in 90 Days and Case Management as Needed in 90 Days    Diagnosis:   F10.20 Other and unspecified alcohol dependence/unspecified drinking behavior,   F14.20 Cocaine dependence-unspecified use, F12.20 Cannabis dependence-unspecified use    Level of Care: 2.1 Intensive Outpatient Services    Problem: Transfer from [facility] for continue IOP Treatment/History of Substance use  Goal: Abstain from using drugs/alcohol in 90 Days   Objectives:   1) Identify 3 Indicators of Addiction in 90 Days. Evaluation Date: 2/8/22  Code: C Continue TBD   2)  Identify 3 Negative Effects of Substance Use in 90 Days. Evaluation Date:  2/8/22 Code: C Continue TBD     iii  3)  Identify 3 benefits of maintaining sobriety  could positively impact your in 90 day Evaluation Date:  2/8/22 Code: C continue TBD    2. Problem: Lacks Coping Skills to Maintain Long-term Sobriety   Goal: Acquire necessary skills to maintain sobriety in 90 Days   Objectives:   1)  Identify 3 Healthy Boundaries that support your recovery in 90 Days Evaluation Date:  2/8/22 Code: Achieved 11/29/22 Saying No, Clearly state your feelings, Treat yourself with respect, Stand by your beliefs, Develop trust over time    2) Identify 3 External/Internal Triggers and Sober Responses to them in 90 Days Evaluation Date: 2/8/22 Code: C Continue TBD   3) 1x Month contact with [facility]  for support in 90 Days Evaluation Date: 2/8/22 Code: C Continue TBD     3.     Problem: Lacks Understanding of Trauma Informed Care (TIC)   Goal: Develop Increased awareness of the Foundations of Trauma Informed Care in 90 Days  Objectives:   1) Identify 3 [place] skills that bolster resilience in 90 Days Evaluation Date: 12/17/22 Code: C Continue TBD  2) Identify 4 Strategies to manage impulsivity in 90 Days Evaluation Date: 12/17/22 Code: C Continue TBD  3) Verbalize 3 Emotions that is associated/altered by Drug use and in 90 Days Evaluation Date: 12/17/22   Code: C Continue TBD   4) Identify 4 Strategies to Build Self-[place] in 90 Days Evaluation Date 12/17/22 Code:  Achieved 12/6/22 Being honest with yourself, Pinpoint strengths and weaknesses, Don't beat yourself up, and Don't compare yourself to others    Defer:  Continue with P    Discharge Plan/Instructions: Complete treatment plan goals and objective and maintain sobriety. Torrey Portillo / 1985 has participated in the treatment plan development outlined above on 12/6/2022.      KASHIF Dozier  30/4/7246/44:87 AM

## 2022-12-06 NOTE — PROGRESS NOTES
[id number]                Progress Note    [x] [place]  [] [place]                    Patient Name: Butch Nj   : 1985     Case # :  [id number]  Therapist: KASHIF Harman        Objective/Service/Time:    .30 MINUTES    GOAL 3a   OBJECTIVE 4      S:  Client completed individual session. She reports no use of substances. She stated, \"I mixed feelings about staying with my bf?\" \"I was able to tell him what I needed from him and he actually spent more time with me and said that he didn't want to be with anyone else. \" \"So I think I'll stick it out and she if he continues this new change. \"     O:  Client was cooperative. A:  Client is in the action stage of change. Client identified strategies in building self-worth: Being honest with yourself, Pinpoint strengths and weaknesses, Don't beat yourself up, and Don't compare yourself to othersShe has a solid support network and will work on completing her Individualized Treatment Plan goals and objectives. \"     P:  Continue in treatment.                 Naman Aranda MA, [facility][note 3], , 82:24 AM

## 2022-12-07 ENCOUNTER — HOSPITAL ENCOUNTER (OUTPATIENT)
Dept: PSYCHIATRY | Age: 37
Setting detail: THERAPIES SERIES
Discharge: HOME | End: 2022-12-07
Payer: COMMERCIAL

## 2022-12-07 PROCEDURE — H2020 THER BEHAV SVC, PER DIEM: HCPCS

## 2022-12-09 ENCOUNTER — HOSPITAL ENCOUNTER (OUTPATIENT)
Dept: PSYCHIATRY | Age: 37
Setting detail: THERAPIES SERIES
Discharge: HOME | End: 2022-12-09
Payer: COMMERCIAL

## 2022-12-09 PROCEDURE — H2020 THER BEHAV SVC, PER DIEM: HCPCS

## 2022-12-12 ENCOUNTER — HOSPITAL ENCOUNTER (OUTPATIENT)
Dept: PSYCHIATRY | Age: 37
Setting detail: THERAPIES SERIES
Discharge: HOME | End: 2022-12-12
Payer: COMMERCIAL

## 2022-12-13 ENCOUNTER — HOSPITAL ENCOUNTER (OUTPATIENT)
Dept: PSYCHIATRY | Age: 37
Setting detail: THERAPIES SERIES
Discharge: HOME | End: 2022-12-13
Payer: COMMERCIAL

## 2022-12-13 ENCOUNTER — APPOINTMENT (OUTPATIENT)
Dept: PSYCHIATRY | Age: 37
End: 2022-12-13
Payer: COMMERCIAL

## 2022-12-13 PROCEDURE — 90834 PSYTX W PT 45 MINUTES: CPT

## 2022-12-13 NOTE — PROGRESS NOTES
ER Documentation


Chief Complaint


Chief Complaint


twisted right knee this am





HPI


Patient is a 17-year-old female who presents to the ED for concerns of right 

knee pain started earlier today.  Patient states she is getting out of her 

friends to her car when she twisted her leg while exiting.  Patient states she 

heard a pop.  Patient states she tried to rest her legs however she states the 

pain has persisted.  Patient denies taking any pain medication.  Patient did 

not fall or hit her head.  Patient denies any previous injuries to the affected 

extremity.  Patient denies any numbness or tingling.  Patient does not recall 

her last menstrual period and is requesting a pregnancy test at this time.  

Patient denies any fevers, chills, nausea, vomiting, back pain or LOC.





ROS


All systems reviewed and are negative except as per history of present illness.





Medications


Home Meds


Active Scripts


Ibuprofen* (Motrin*) 600 Mg Tab, 600 MG PO Q6, #30 TAB


   Prov:OLIVIER COLE PA-C         17


Nitrofurantoin Monohyd Macrocr* (Macrobid*) 100 Mg Capsr, 100 MG PO BID for 7 

Days, CAP


   Prov:CHACORTA BOOKER PA-C         17


Reported Medications


Prenatal Vits W-Ca,Fe,Fa(<1MG) (Prenatal Vitamins) 1 Tab Tablet, 1 TAB PO DAILY


   13





Allergies


Allergies:  


Coded Allergies:  


     No Known Allergy (Unverified , 17)





PMhx/Soc


Medical and Surgical Hx:  pt denies Medical Hx, pt denies Surgical Hx


History of Surgery:  No


Anesthesia Reaction:  No


Hx Neurological Disorder:  No


Hx Respiratory Disorders:  No


Hx Cardiac Disorders:  No


Hx Psychiatric Problems:  No


Hx Miscellaneous Medical Probl:  No


Hx Alcohol Use:  No


Hx Substance Use:  No


Hx Tobacco Use:  Yes


Smoking Status:  Current every day smoker





Physical Exam


Vitals





Vital Signs








  Date Time  Temp Pulse Resp B/P Pulse Ox O2 Delivery O2 Flow Rate FiO2


 


17 23:22 98.0 63 20 107/64 98 Room Air  


 


17 18:41 97.8 85 20 113/62 99   








Physical Exam


GENERAL: Well-developed, well-nourished female. Appears in no acute distress. 


HEAD: Normocephalic, atraumatic. 


EYES: Pupils are equally reactive bilaterally. EOMs grossly intact. No 

conjunctival erythema. 


NECK: Supple. No meningismus. Normal range of motion of the neck.


LUNG: Clear to auscultation bilaterally. No rhonchi, wheezing, rales or coarse 

breath sounds. 


HEART: Regular rate and rhythm. No murmurs, rubs or gallops.


EXTREMITIES: Equal pulses bilaterally. No peripheral clubbing, cyanosis or 

edema. No unilateral leg swelling.


NEUROLOGIC: Alert and oriented. Moving all four extremities without any 

difficulty. Normal speech.  


SKIN: Normal color. Warm and dry. No rashes or lesions.


RIGHT KNEE: No deformity, erythema, ecchymosis.  Minimal swelling noted on the 

medial aspect of the knee.  Skin is intact.  Tender to patient over the medial 

and anterior knee.  Nontender to palpation of the thigh, proximal tibia/fibula, 

ankle.  Decreased range of motion of the knee secondary to pain.  Normal range 

of motion of the ankle.  No valgus/varus instability. Sensation intact to light 

touch. Neurovascularly intact. (Able to plantarflex, dorsiflex, lorin foot, 

invert foot, raise big toe.) 2+ DP pulses.


Results 24 hrs





 Current Medications








 Medications


  (Trade)  Dose


 Ordered  Sig/Opal


 Route


 PRN Reason  Start Time


 Stop Time Status Last Admin


Dose Admin


 


 Acetaminophen/


 Hydrocodone Bitart


  (Norco (5/325))  1 tab  ONCE  ONCE


 PO


   17 20:30


 17 20:31 DC 17 22:37


 











Procedures/MDM


ED COURSE:


The patient was stable throughout ED course. I kept the patient and/or family 

informed of laboratory and diagnostic imaging results throughout the ED course.

  





 


DIAGNOSTIC IMAGING:


Read by radiologist.


 Patient: ADRIANNE RIDLEY   : 2000   Age: 17  Sex: F                     

   


 MR #:    [id number]   Acct #:   [id number]    DOS: 17


 Ordering MD: OLIVIER COLE PA-C   Location:  FTE   Room/Bed:                 

                           


 








PROCEDURE:   XR Knee. 


 


CLINICAL INDICATION:   Right knee pain. 


 


TECHNIQUE:   Three views of the right knee.


 


COMPARISON:   None available 


 


FINDINGS:


 


There is no acute fracture or dislocation.  The joint spaces are preserved.  No 

joint effusion is identified.


 


IMPRESSION:


 


1.  No acute fracture or dislocation of the right knee.


 


 


 


 


 


RPTAT: HTAR


_____________________________________________ 


.Jose Mitchell MD, MD           Date    Time 


Electronically viewed and signed by .Jose Mitchell MD, MD on 2017 23:06 


 


D:  2017 23:06  T:  2017 23:06


.R/





CC: OLIVIER COLE PA-C








PROCEDURES:


None.





MEDICATIONS GIVEN: 


Norco


Patient tolerated medication well with no adverse reactions.








MEDICAL DECISION MAKING:


This is a 17-year-old female presents with right knee pain started earlier this 

morning.  Patient states she is getting out of her friend's car when she felt 

that her knee twisted.  Did not fall to the ground.  Patient denies any head 

injury.. Vital signs were reviewed. Patient was afebrile. 


X-ray imaging was negative for acute fracture or dislocation. Given these 

findings, the patients presentation is most consistent with right knee pain. I 

have a much lower clinical concern for femur fracture, patella fracture, tibial 

plateau fracture, septic joint, gout, popliteal cyst, prepatellar bursitis, 

patellofemoral syndrome, patellar tendinitis, osteomyelitis, DVT or compartment 

syndrome.  At this time, unable to rule out any meniscus and knee ligament 

injuries.





PRESCRIPTIONS:


Ibuprofen





DISCHARGE:


At this time, patient is stable for discharge and outpatient management.  

Patient was given a copy of imaging studies obtained today.  RICE therapy and 

ROM exercises were advised to avoid stiffness. I have instructed the patient to 

follow-up with his/her primary care physician in 1-2 days. I have discussed 

with the patient the possibility of needing to see an orthopedic specialist for 

further workup and imaging if the pain persists. I have instructed the patient 

to promptly return to the ER for any new or worsening symptoms including 

increased pain, swelling, redness, warmth or fever. The patient and/or family 

expressed understanding of and agreement with this plan. All questions were 

answered. Home care instructions were provided. 





Disclaimer: Inadvertent spelling and grammatical errors are likely due to EHR/

dictation software use and do not reflect on the overall quality of patient 

care. Also, please note that the electronic time recorded on this note does not 

necessarily reflect the actual time of the patient encounter.





Departure


Diagnosis:  


 Primary Impression:  


 Right knee pain


 Chronicity:  acute  Qualified Code:  M25.561 - Acute pain of right knee


Condition:  Stable


Patient Instructions:  Knee Pain, Uncertain Cause





Additional Instructions:  


Call your primary care doctor TOMORROW for an appointment during the next 1-2 

days.See the doctor sooner or return here if your condition worsens before your 

appointment time.





Unable to rule out any ligament or tendon injuries at this time.  Patient 

advised to follow-up with an orthopedic specialist and/or obtain MRI and an 

outpatient basis if pain persists.











OLIVIER COLE PA-C Dec 16, 2017 20:44 [facility] TREATMENT PLAN       Location: [x] [place] [] [place]    Treatment plan:   GWCXEL  8461   (12/13/22)    Strengths:  Determined, Good mother    Weakness/Limitations:  Lacks personal boundaries    Service/Frequency/Duration: Individual 1-Week in 90 Days, IOP Mon/Wed/Fri from 9-12 in 90 Days, Urinalysis Random monthly in 90 Days, AA/NA 2-4 Weekly in 90 Days, Sponsor Contact Weekly in 90 Days and Case Management as Needed in 90 Days    Diagnosis:   F10.20 Other and unspecified alcohol dependence/unspecified drinking behavior,   F14.20 Cocaine dependence-unspecified use, F12.20 Cannabis dependence-unspecified use    Level of Care: 2.1 Intensive Outpatient Services    Problem: Transfer from [facility] for continue IOP Treatment/History of Substance use  Goal: Abstain from using drugs/alcohol in 90 Days   Objectives:   1) Identify 3 Indicators of Addiction in 90 Days. Evaluation Date: 2/8/22  Code: C Continue TBD   2)  Identify 3 Negative Effects of Substance Use in 90 Days. Evaluation Date:  2/8/22 Code: C Continue TBD     iii  3)  Identify 3 benefits of maintaining sobriety  could positively impact your in 90 day Evaluation Date:  2/8/22 Code: C continue TBD    2. Problem: Lacks Coping Skills to Maintain Long-term Sobriety   Goal: Acquire necessary skills to maintain sobriety in 90 Days   Objectives:   1)  Identify 3 Healthy Boundaries that support your recovery in 90 Days Evaluation Date:  2/8/22 Code: Achieved 11/29/22 Saying No, Clearly state your feelings, Treat yourself with respect, Stand by your beliefs, Develop trust over time    2) Identify 3 External/Internal Triggers and Sober Responses to them in 90 Days Evaluation Date: 2/8/22 Code: C Continue TBD   3) 1x Month contact with [facility]  for support in 90 Days Evaluation Date: 2/8/22 Code: C Continue TBD     3.     Problem: Lacks Understanding of Trauma Informed Care (TIC)   Goal: Develop Increased awareness of the Foundations of Trauma

## 2022-12-13 NOTE — GROUP NOTE
[facility] Group Therapy Note      12/12/2022    Location:  [place]      Clients Presents: [phone]    Clients Absent: 1330    Length of session: 3.0 hours    Group Note: [facility]    Group Type: Co-Ed    New members were welcomed and introduced. Norms and expectations of group were discussed. Content: GROUP CHECKED-IN  TOPIC:  \"How Well Are You Preventing Relapse? \"  Clients answered discussion questions to evaluate how well they are preventing relapse? Mitchell Dozier  68/48/5843 27:84 AM        [facility] [employer] Individual Group Progress Note    Deysi Singh  1985 12/13/2022    Notes on Client Progress in Group    Jessie Martinez was attentive. She stated the following goal in treatment: Long-term Sobriety!  I'm making progress!  The recovery skills I'm using: Watching TV/Movies and Spending time with family!  Her level of Depression-1 Anger-3 Anxiety-1 today. She reports no use and no cravings. She reported her biggest stressor: Yoandy!     Participated in the group discussion, shared openly, offered insight on the strategies she is using to prevent relapse. Peers gave feedback and support to each other.       Mitchell Dozier  78/18/6383 37:61 AM    Co-Therapist: N/A

## 2022-12-13 NOTE — PROGRESS NOTES
[address] [facility]                Progress Note    [x] [place]  [] [place]                    Patient Name: Keny Thompson   : 1985     Case # :  [id number]  Therapist: KASHIF Snell        Objective/Service/Time:      1-HOUR     GOAL 3a   OBJECTIVE 1        S:  Client completed individual session. She reports no use of substances. She stated, \"I need to work some extra hours this week to be able to get what I want to get for my kids! \" Client shared more about how her relationship with her bf is getting better and hopes to solidify their relationship even more in the [place]. \"     O:  Client was cooperative. A:  Client is in the action stage of change. Client identified strategies in building resilience: self-awareness, mindfulness, self-care, positive relationships and purpose. She has a solid support network and will work on completing her Individualized Treatment Plan goals and objectives. \"     P:  Continue in treatment.                   Alex Smalls MA, [place] [facility] 320, 26/15/15, 1:64 PM

## 2022-12-14 ENCOUNTER — HOSPITAL ENCOUNTER (OUTPATIENT)
Dept: PSYCHIATRY | Age: 37
Setting detail: THERAPIES SERIES
Discharge: HOME | End: 2022-12-14
Payer: COMMERCIAL

## 2022-12-16 ENCOUNTER — HOSPITAL ENCOUNTER (OUTPATIENT)
Dept: PSYCHIATRY | Age: 37
Setting detail: THERAPIES SERIES
Discharge: HOME | End: 2022-12-16
Payer: COMMERCIAL

## 2022-12-16 PROCEDURE — H2020 THER BEHAV SVC, PER DIEM: HCPCS

## 2022-12-16 NOTE — GROUP NOTE
[facility] Group Therapy Note      12/13/2022    Location:  [place]      Clients Presents: [phone]    Clients Absent: 1344    Length of session: 3.0 hours    Group Note: IOP    Group Type: Co-Ed    New members were welcomed and introduced. Norms and expectations of group were discussed. Content: Group Checked-In  TOPIC:  \"Social Survival Manual\"  Clients discussed the dangers of surviving the holiday season with social events and how to maintain sobriety during this time. Clients also completed treatment worksheets where they identified tips to survive the holiday season  without relapsing: (Consider not going to an event, Go with  sober support person, Arrive late and leave early, Plan in advance where you will go and what you will say. Alexandria Dozier [facility] 320  45/15/1379 76:21 AM        [facility] Individual Group Progress Note    Sb Zoe  1985 12/16/2022    Notes on Client Progress in Group    Huy Quinn was attentive. She stated the following goal in treatment: Long-term Sobriety!  I'm making progress!  The recovery skills I'm using: Watching TV/Movies and Spending time with family!  Her level of Depression-1 Anger-1 Anxiety-1 today. She reports no use and no cravings. She reported no major stressor today. Gene Ventura participated in the group discussion. She stated, I've been trying to set boundaries with my best friend who is active use.  I may have to cut her off period!  I'm not going to use!  I feel for her daughter!   Peers gave feedback and support to each other.       Alexandria Dozier [facility] 320  16/73/3715 8:10 PM    Co-Therapist: N/A

## 2022-12-16 NOTE — PROGRESS NOTES
[facility] TREATMENT PLAN       Location: [x] [place] [] Yue Singh    Treatment plan:   ZICVYP  5697   (12/16/22)    Strengths:  Determined, Good mother    Weakness/Limitations:  Lacks personal boundaries    Service/Frequency/Duration: Individual 1-Week in 90 Days, IOP Mon/Wed/Fri from 9-12 in 90 Days, Urinalysis Random monthly in 90 Days, AA/NA 2-4 Weekly in 90 Days, Sponsor Contact Weekly in 90 Days and Case Management as Needed in 90 Days    Diagnosis:   F10.20 Other and unspecified alcohol dependence/unspecified drinking behavior,   F14.20 Cocaine dependence-unspecified use, F12.20 Cannabis dependence-unspecified use    Level of Care: 2.1 Intensive Outpatient Services    Problem: Transfer from [facility] for continue IOP Treatment/History of Substance use  Goal: Abstain from using drugs/alcohol in 90 Days   Objectives:   1) Identify 3 Indicators of Addiction in 90 Days. Evaluation Date: 2/8/22  Code: Achieved  11/7/22 Loss of Control, Need and Compulsion, Continued Use Despite Adverse Consequences (See Group Note)  2)  Identify 3 Negative Effects of Substance Use in 90 Days. Evaluation Date:  2/8/22 Code: C Continue TBD     iii  3)  Identify 3 benefits of maintaining sobriety  could positively impact your in 90 day Evaluation Date:  2/8/22 Code: C continue TBD    2. Problem: Lacks Coping Skills to Maintain Long-term Sobriety   Goal: Acquire necessary skills to maintain sobriety in 90 Days   Objectives:   1)  Identify 3 Healthy Boundaries that support your recovery in 90 Days Evaluation Date:  2/8/22 Code: Achieved 11/29/22 Saying No, Clearly state your feelings, Treat yourself with respect, Stand by your beliefs, Develop trust over time    2) Identify 3 External/Internal Triggers and Sober Responses to them in 90 Days Evaluation Date: 2/8/22 Code: C Continue TBD   3) 1x Month contact with [facility]  for support in 90 Days Evaluation Date: 2/8/22 Code: C Continue TBD     3.     Problem: Lacks Understanding of Trauma Informed Care (TIC)   Goal: Develop Increased awareness of the Foundations of Trauma Informed Care in 90 Days  Objectives:   1) Identify 4 [place] skills that bolster resilience in 90 Days Evaluation Date: 12/17/22 Code: Achieved 12/13/22  self-awareness, mindfulness, self-care, positive relationships and purpose  2) Identify 4 Strategies to manage impulsivity in 90 Days Evaluation Date: 12/17/22 Code: C Continue TBD  3) Verbalize 3 Emotions that is associated/altered by Drug use and in 90 Days Evaluation Date: 12/17/22   Code: C Continue TBD   4) Identify 4 Strategies to Build Self-[place] in 90 Days Evaluation Date 12/17/22 Code:  Achieved 12/6/22 Being honest with yourself, Pinpoint strengths and weaknesses, Don't beat yourself up, and Don't compare yourself to others    Defer:  Continue with [facility]    Discharge Plan/Instructions: Complete treatment plan goals and objective and maintain sobriety. Anjali Loyd / 1985 has participated in the treatment plan development outlined above on 12/16/2022.      [facility] Hospital[facility]  89/84/2484/0:23 AM

## 2022-12-16 NOTE — GROUP NOTE
[facility] Group Therapy Note      12/16/2022    Location:  [place]      Clients Presents: [phone]    Clients Absent: [phone]    Length of session: 1.5 hours    Group Note: IOP    Group Type: Co-Ed    New members were welcomed and introduced. Norms and expectations of group were discussed. Content: GROUP CHECKED-IN (Only 2 group members showed for group)  TOPIC:  \"Assessing Your Lifestyle\"  Clients read and completed an assessment where they evaluated 9 areas of their lives to see what they can improve on.     Mitchell Dozier  88/80/3288 9:45 PM        [facility] Individual Group Progress Note    Deysi Signh  1985 12/16/2022    Notes on Client Progress in Group    Reason for Absence: JAYSON Sethi  87/53/3509 0:18 PM    Co-Therapist: N/A

## 2022-12-19 ENCOUNTER — HOSPITAL ENCOUNTER (OUTPATIENT)
Dept: PSYCHIATRY | Age: 37
Setting detail: THERAPIES SERIES
Discharge: HOME | End: 2022-12-19
Payer: COMMERCIAL

## 2022-12-19 NOTE — GROUP NOTE
[facility] Group Therapy Note      12/19/2022    Location:  [place]      Clients Presents: [phone]    Clients Absent: [phone]    Length of session: 3.0 hours    Group Note: IOP    Group Type: Co-Ed    New members were welcomed and introduced. Norms and expectations of group were discussed. Content: GROUP CHECKED-IN  TOPIC:  \"Interactive Journal on FEELINGS\" Part I  Clients explored the connection between feelings and behaviors. Clients learned where feelings come from and discovered how to change the way they feel by changing their self-talk.     Mitchell Dozier  98/20/8678 0:83 PM        [facility] Individual Group Progress Note    Lacey Payne  1985 12/19/2022    Notes on Client Progress in Group    Reason for Absence: CX-Client called and reported that her oldest daughter was rushed to the hospital.     Mitchell Dozier  23/61/4898 3:74 PM    Co-Therapist: N/A

## 2022-12-19 NOTE — GROUP NOTE
[facility] Group Therapy Note      12/16/2022    Location:  [place]      Clients Presents: [phone]    Clients Absent: [phone]    Length of session: 3.0 hours    Group Note: IOP    Group Type: Co-Ed    New members were welcomed and introduced. Norms and expectations of group were discussed. Content: GROUP CHECKED-IN  TOPIC: \"The Progression of the Disease of Addiction\"  Clients viewed and discussed the \"Flight\" movie. Clients shared what characters they could relate. Clients also identified various ways denial can show up in the life of a person who is dealing with substance abuse.     Mitchell Howell  02/36/3856 6:51 PM        [facility] Individual Group Progress Note    Jessica Newby  1985 12/19/2022    Notes on Client Progress in Group    Reason for Absence: CX-Out of town with drug court     Mitchell Howell  23/05/5612 8:66 PM    Co-Therapist: N/A

## 2022-12-19 NOTE — GROUP NOTE
[facility] Group Therapy Note      12/19/2022    Location:  [place]      Clients Presents: [phone]    Clients Absent: [phone]     Length of session: 3.0 hours    Group Note: IOP    Group Type: Co-Ed    New members were welcomed and introduced. Norms and expectations of group were discussed. Content: GROUP CHECKED-IN  TOPIC:  \"Self-Care Assessment\"  Clients completed a self-care assessment in the following areas:  Personal and Social. Clients identified 3 goals and strategies to reach them. Mitchell Dozier  41/88/1118 0:37 PM        [facility] Individual Group Progress Note    Nancy Uriarte  1985 12/19/2022    Notes on Client Progress in Group    Jewell Chambers was attentive. She stated the following goal in treatment: Long-term Sobriety!  I'm making progress!  The recovery skills I'm using: Watching TV/Movies and Spending time with family!  Her level of Depression-1 Anger-5 Anxiety-1 today. She reports no use and no cravings. She reports her biggest stressor: Work! Matthias Mistry participated in the group discussion. She identified the following 3 goals:  [address] [facility], IOP and Find a new job. Peers gave feedback and support to each other.       Mitchell Dozier  73/69/7067 9:80 PM    Co-Therapist: N/A

## 2022-12-20 ENCOUNTER — APPOINTMENT (OUTPATIENT)
Dept: PSYCHIATRY | Age: 37
End: 2022-12-20
Payer: COMMERCIAL

## 2022-12-20 ENCOUNTER — HOSPITAL ENCOUNTER (OUTPATIENT)
Dept: PSYCHIATRY | Age: 37
Setting detail: THERAPIES SERIES
Discharge: HOME | End: 2022-12-20
Payer: COMMERCIAL

## 2022-12-20 NOTE — PROGRESS NOTES
[facility]                Progress Note    [x] [place]  [] Christen escoto                    Patient Name: Didi Beach   : 1985     Case # :  [id number]  Therapist: KASHIF Ocasio        Objective/Service/Time:    CX-Daughter had a school function at [facility], , 5:74 PM

## 2022-12-21 ENCOUNTER — HOSPITAL ENCOUNTER (OUTPATIENT)
Dept: PSYCHIATRY | Age: 37
Setting detail: THERAPIES SERIES
Discharge: HOME | End: 2022-12-21
Payer: COMMERCIAL

## 2022-12-21 PROCEDURE — H2020 THER BEHAV SVC, PER DIEM: HCPCS

## 2022-12-22 NOTE — GROUP NOTE
[facility] Group Therapy Note      12/22/2022    Location:  [place]      Clients Presents: [phone]    Clients Absent: 1330    Length of session: 3.0 hours    Group Note: IOP    Group Type: Co-Ed    New members were welcomed and introduced. Norms and expectations of group were discussed. Content: GROUP CHECKED-IN  TOPIC: Interactive Journal on [address]" Part II  Clients identified feelings that may put them at risk for unhealthy behaviors and learned healthy strategies in making changes to their feelings. Mitchell Dozier  50/89/5329 05:94 AM        [employer] Individual Group Progress Note    Alfredo Lawrence  1985 12/22/2022    Notes on Client Progress in Group    [facility] was attentive. She stated the following goal in treatment: Long-term Sobriety!  I'm making progress!  The recovery skills I'm using: Watching TV/Movies and Spending time with family!  Her level of Depression-1 Anger-1 Anxiety-1 today. She reports no use and no cravings. She reports her biggest stressor: Ex-! Rodolfo Sanchez participated in the group discussion. She identified her own top 3 feelings and how they affect her attitude and her behaviors. She stated, My mental health medications have really helped me to manage my feelings in more healthier ways.    Peers gave feedback and support to each other.      Mitchell Dozier  59/81/6329 5:50 PM    Co-Therapist: N/A

## 2022-12-23 ENCOUNTER — HOSPITAL ENCOUNTER (OUTPATIENT)
Dept: PSYCHIATRY | Age: 37
Setting detail: THERAPIES SERIES
End: 2022-12-23
Payer: COMMERCIAL

## 2022-12-27 ENCOUNTER — APPOINTMENT (OUTPATIENT)
Dept: PSYCHIATRY | Age: 37
End: 2022-12-27
Payer: COMMERCIAL

## 2022-12-28 ENCOUNTER — HOSPITAL ENCOUNTER (OUTPATIENT)
Dept: PSYCHIATRY | Age: 37
Setting detail: THERAPIES SERIES
Discharge: HOME | End: 2022-12-28
Payer: COMMERCIAL

## 2022-12-28 PROCEDURE — H2020 THER BEHAV SVC, PER DIEM: HCPCS

## 2022-12-30 ENCOUNTER — HOSPITAL ENCOUNTER (OUTPATIENT)
Dept: PSYCHIATRY | Age: 37
Setting detail: THERAPIES SERIES
End: 2022-12-30
Payer: COMMERCIAL

## 2022-12-30 NOTE — GROUP NOTE
[facility] Group Therapy Note      12/29/2022    Location:  [place]      Clients Presents: 0    Clients Absent: 0    Length of session: 3.0 hours    Group Note: IOP    Group Type: Co-Ed    New members were welcomed and introduced. Norms and expectations of group were discussed.     Content: AGENCY CLOSURE DUE TO Mitchell Diaz  91/30/9715 6:15 PM          [facility] Individual Group Progress Note    Abhi Yanez  1985 12/30/2022    Notes on Client Progress in Group    Reason for Absence: DNS     Mitchell Bai  33/92/8562 7:03 AM    Co-Therapist: {JONATHAN J/T:[id number]}

## 2022-12-30 NOTE — GROUP NOTE
[facility] Group Therapy Note      12/30/2022    Location:  [place]      Clients Presents: [phone]     Clients Absent: [phone]    Length of session: 3.0 hours    Group Note: IOP    Group Type: Co-Ed    New members were welcomed and introduced. Norms and expectations of group were discussed. Content: GROUP CHECKED-IN  TOPIC:  \"Feelings Journal\" Part II  Clients completed pages 14 and 15 where they identified 3 feelings that have caused problems for them along with strategies to help them cope with those difficult feelings. Mitchell Dozier  75/20/1089 2:06 AM        [employer] Individual Group Progress Note    Ginny Enter  1985 12/30/2022    Notes on Client Progress in Group    Noemí Prestons was attentive. This was her last IOP group, and she will begin on Level I (2x week) next week. She stated the following goal in treatment: Long-term Sobriety!  I'm making progress!  The recovery skills I'm using: Watching TV/Movies and Spending time with family!  Her level of Depression-1 Anger-1 Anxiety-1 today. She reports no use and no cravings. She reports finding another job at [employer] and needing more money to pay her bills. She reports her biggest stressor: Handling two jobs! Delbert Meyers participated in the group discussion. She completed pages 14 and 15 in her feelings journal where she identified the 3 top difficult feelings she deals with and healthy strategies to cope with them. Peers gave feedback and support to each other.      Mitchell Dozier  74/32/1864 6:23 PM    Co-Therapist: N/A

## 2023-01-09 ENCOUNTER — HOSPITAL ENCOUNTER (OUTPATIENT)
Dept: PSYCHIATRY | Age: 38
Setting detail: THERAPIES SERIES
Discharge: HOME | End: 2023-01-09

## 2023-01-09 NOTE — GROUP NOTE
[facility] Group Therapy Note      1/9/2023    Location:  [email or web address]      Clients Presents: [phone] [phone] [phone] [phone] [phone]     Clients Absent: 1335, 1323, 1330, 1333, 1326       Length of session: 1.5 hours    Group Note: OP    Group Type: Co-Ed    New members were welcomed and introduced. Norms and expectations of group were discussed.       Content: Understanding Substance Use Progression, Tolerance and Secondary Consequences         STEPHANIE Le  7/1/6603 6:78 PM      Co-Therapist: N/A      [facility] Individual Group Progress Note    Jessica Newby  1985 1/9/2023    Notes on Client Progress in Group      Did not show         STEPHANIE Le  4/9/6445 5:34 PM        Co-Therapist: N/A

## 2023-02-28 ENCOUNTER — HOSPITAL ENCOUNTER (EMERGENCY)
Age: 38
Discharge: HOME | End: 2023-02-28
Attending: EMERGENCY MEDICINE
Payer: COMMERCIAL

## 2023-02-28 VITALS
OXYGEN SATURATION: 94 % | HEART RATE: 98 BPM | SYSTOLIC BLOOD PRESSURE: 118 MMHG | DIASTOLIC BLOOD PRESSURE: 78 MMHG | RESPIRATION RATE: 18 BRPM

## 2023-02-28 DIAGNOSIS — F14.121: Primary | ICD-10-CM

## 2023-02-28 PROCEDURE — 99284 EMERGENCY DEPT VISIT MOD MDM: CPT

## 2023-02-28 PROCEDURE — 6360000002 HC RX W HCPCS: Performed by: EMERGENCY MEDICINE

## 2023-02-28 PROCEDURE — 96372 THER/PROPH/DIAG INJ SC/IM: CPT

## 2023-02-28 RX ORDER — LORAZEPAM 2 MG/ML
2 INJECTION INTRAMUSCULAR ONCE
Status: COMPLETED | OUTPATIENT
Start: 2023-02-28 | End: 2023-02-28

## 2023-02-28 RX ADMIN — LORAZEPAM 2 MG: 2 INJECTION INTRAMUSCULAR; INTRAVENOUS at 04:01

## 2023-02-28 NOTE — ED NOTES
Patient brought in for phychosis after ingesting a possible bad batch of cocaine.       Kyle Thornton RN  02/28/23 5524

## 2023-02-28 NOTE — ED NOTES
Patient is wanting to go home and go to bed. She states she is feeling much better now.       Khloe Andrews RN  02/28/23 7825

## 2023-02-28 NOTE — ED PROVIDER NOTES
Triage Chief Complaint:   Drug / Alcohol Assessment    Hoh:  Denver Haus is a 42 y.o. female that presents brought in by niece for psychosis secondary to cocaine use. He states that symptoms started around 4 hours ago and have been escalating. States that this is because of patient's cocaine use. States that she has acted like this before in the past.  States that the patient thought that they were \"driving into a ball of fire\" on the way to the hospital.  No reported suicidal or homicidal ideation. Patient admits to using cocaine. Denies use the use of any other drugs. Denies any other complaints. States that she is agreeable to receiving some medication to calm her down. ROS:  At least 10 systems reviewed and otherwise acutely negative except as in the [address] Past Medical History:   Diagnosis Date    Anxiety     Depression      No past surgical history on file. No family history on file. Social History     Socioeconomic History    Marital status: Legally      Spouse name: Not on file    Number of children: Not on file    Years of education: Not on file    Highest education level: Not on file   Occupational History    Not on file   Tobacco Use    Smoking status: Every Day     Packs/day: 1.00     Types: Cigarettes    Smokeless tobacco: Never   Substance and Sexual Activity    Alcohol use: Yes     Comment: occ    Drug use: No    Sexual activity: Not on file   Other Topics Concern    Not on file   Social History Narrative    Not on file     Social Determinants of Health     Financial Resource Strain: Not on file   Food Insecurity: Not on file   Transportation Needs: Not on file   Physical Activity: Not on file   Stress: Not on file   Social Connections: Not on file   Intimate Partner Violence: Not on file   Housing Stability: Not on file     No current facility-administered medications for this encounter.      Current Outpatient Medications   Medication Sig Dispense Refill    sertraline (ZOLOFT) 50 MG tablet Take 50 mg by mouth daily      ibuprofen (IBU) 600 MG tablet Take 1 tablet by mouth every 6 hours as needed for Pain 20 tablet 0    Prenatal Multivit-Min-Fe-FA (PRENATAL #2 PO) Take by mouth       Allergies   Allergen Reactions    Cephalexin Swelling    Sulfa Antibiotics Swelling       Nursing Notes Reviewed    Physical Exam:  ED Triage Vitals [02/28/23 0351]   Enc Vitals Group      BP (!) 144/87      Heart Rate (!) 128      Resp 16      Temp       Temp src       SpO2 98 %      Weight       Height       Head Circumference       Peak Flow       Pain Score       Pain Loc       Pain Edu? Excl. in [address]? GENERAL APPEARANCE: Awake and alert. Cooperative. Agitated. HEENT: Head NC/AT, EOM's grossly intact. Conjunctiva anicteric. Mucous membranes moist. Tolerates saliva. No trismus. Neck supple. Trachea midline. HEART: Tachycardic. Radial pulses 2+. LUNGS: Respirations unlabored. CTAB  ABDOMEN: Soft. Non-tender. No guarding or rebound. EXTREMITIES: No acute deformities. SKIN: Warm and dry. NEUROLOGICAL: No gross facial drooping. Moves all 4 extremities spontaneously. PSYCHIATRIC: Agitated. Darting eye movements. Appears suspicious of others. Hallucinations. Denies SI or HI. I have reviewed and interpreted all of the currently available lab results from this visit (if applicable):  No results found for this visit on 02/28/23. Radiographs (if obtained):  [] The following radiograph was interpreted by myself in the absence of a radiologist:  [] Radiologist's Report Reviewed:    Medical Decision Making and ED Course:    CC/HPI Summary, DDx, ED Course, and Reassessment: Patient presents as above. She presents Ariel Monroe with symptoms consistent with likely sympathomimetic process consistent with recent cocaine use. She has dilated pupils, tachycardia and appears paranoid with hallucinations. Denies any suicidal or homicidal ideation.   Patient is agreeable to taking Ativan which she is given.  Low suspicion for other acute emergent process at this time that requires further evaluation but patient will be watched closely in the emergency department and reevaluated intervals until clinically sober. On reevaluation, the patient is resting comfortably. She states that she feels much better. States that due to her underlying psychiatric condition this becomes exacerbated when she uses cocaine and is aware of this. States that she relapsed about 2 weeks ago. States that she is planning on going back to [facility] for rehab and has their information. She denies any other acute complaints. Denies any suicidal ideation. She will be discharged home with niece at this time and she is agreeable with this plan of care. History from : Patient and Family niece    Limitations to history : None    Patient was given the following medications:  Medications   LORazepam (ATIVAN) injection 2 mg (2 mg IntraMUSCular Given 2/28/23 0401)       Independent Imaging Interpretation by me:     EKG (if obtained): (All EKG's are interpreted by myself in the absence of a cardiologist)     Chronic conditions affecting care:     Discussion with Other Profesionals : None    Social Determinants : None      Disposition Considerations (tests considered but not done, Shared Decision Making, Pt Expectation of Test or Tx.):     Appropriate for outpatient management      I am the Primary Clinician of Record. Clinical Impression:  1.  Cocaine abuse with intoxication with delirium ([facility] [address]      (Please note that portions of this note may have been completed with a voice recognition program. Efforts were made to edit the dictations but occasionally words are mis-transcribed.)    MD Kirill Pineda MD  02/28/23 9946

## 2023-03-09 ENCOUNTER — HOSPITAL ENCOUNTER (EMERGENCY)
Age: 38
Discharge: PSYCHIATRIC OR SUBSTANCE USE FACILITY | End: 2023-03-09
Attending: EMERGENCY MEDICINE
Payer: COMMERCIAL

## 2023-03-09 VITALS
RESPIRATION RATE: 18 BRPM | BODY MASS INDEX: 19.49 KG/M2 | HEIGHT: 63 IN | HEART RATE: 84 BPM | SYSTOLIC BLOOD PRESSURE: 128 MMHG | DIASTOLIC BLOOD PRESSURE: 78 MMHG | WEIGHT: 110 LBS | TEMPERATURE: 97.3 F | OXYGEN SATURATION: 99 %

## 2023-03-09 DIAGNOSIS — F29 PSYCHOSIS, UNSPECIFIED PSYCHOSIS TYPE (HCC): Primary | ICD-10-CM

## 2023-03-09 DIAGNOSIS — N30.01 ACUTE CYSTITIS WITH HEMATURIA: ICD-10-CM

## 2023-03-09 LAB
ACETAMINOPHEN LEVEL: <5 UG/ML (ref 15–30)
ALBUMIN SERPL-MCNC: 4.6 GM/DL (ref 3.4–5)
ALCOHOL SCREEN SERUM: <0.01 %WT/VOL
ALP BLD-CCNC: 86 IU/L (ref 40–129)
ALT SERPL-CCNC: 12 U/L (ref 10–40)
AMPHETAMINES: NEGATIVE
ANION GAP SERPL CALCULATED.3IONS-SCNC: 12 MMOL/L (ref 4–16)
AST SERPL-CCNC: 20 IU/L (ref 15–37)
BACTERIA: ABNORMAL /HPF
BARBITURATE SCREEN URINE: NEGATIVE
BASOPHILS ABSOLUTE: 0.1 K/CU MM
BASOPHILS RELATIVE PERCENT: 0.7 % (ref 0–1)
BENZODIAZEPINE SCREEN, URINE: NEGATIVE
BILIRUB SERPL-MCNC: 0.2 MG/DL (ref 0–1)
BILIRUBIN URINE: NEGATIVE MG/DL
BLOOD, URINE: ABNORMAL
BUN SERPL-MCNC: 13 MG/DL (ref 6–23)
CALCIUM SERPL-MCNC: 9.4 MG/DL (ref 8.3–10.6)
CANNABINOID SCREEN URINE: NEGATIVE
CHLORIDE BLD-SCNC: 99 MMOL/L (ref 99–110)
CLARITY: ABNORMAL
CO2: 26 MMOL/L (ref 21–32)
COCAINE METABOLITE: ABNORMAL
COLOR: YELLOW
CREAT SERPL-MCNC: 0.8 MG/DL (ref 0.6–1.1)
DIFFERENTIAL TYPE: ABNORMAL
DOSE AMOUNT: ABNORMAL
DOSE AMOUNT: ABNORMAL
DOSE TIME: ABNORMAL
DOSE TIME: ABNORMAL
EOSINOPHILS ABSOLUTE: 0.1 K/CU MM
EOSINOPHILS RELATIVE PERCENT: 1.4 % (ref 0–3)
GFR SERPL CREATININE-BSD FRML MDRD: >60 ML/MIN/1.73M2
GLUCOSE SERPL-MCNC: 165 MG/DL (ref 70–99)
GLUCOSE, URINE: NEGATIVE MG/DL
HCT VFR BLD CALC: 42.7 % (ref 37–47)
HEMOGLOBIN: 14.5 GM/DL (ref 12.5–16)
HYALINE CASTS: 2 /LPF
IMMATURE NEUTROPHIL %: 0.5 % (ref 0–0.43)
KETONES, URINE: NEGATIVE MG/DL
LEUKOCYTE ESTERASE, URINE: ABNORMAL
LYMPHOCYTES ABSOLUTE: 2.6 K/CU MM
LYMPHOCYTES RELATIVE PERCENT: 26.6 % (ref 24–44)
MCH RBC QN AUTO: 31.6 PG (ref 27–31)
MCHC RBC AUTO-ENTMCNC: 34 % (ref 32–36)
MCV RBC AUTO: 93 FL (ref 78–100)
MONOCYTES ABSOLUTE: 0.6 K/CU MM
MONOCYTES RELATIVE PERCENT: 5.7 % (ref 0–4)
MUCUS: ABNORMAL HPF
NITRITE URINE, QUANTITATIVE: POSITIVE
NUCLEATED RBC %: 0 %
OPIATES, URINE: NEGATIVE
OXYCODONE: NEGATIVE
PDW BLD-RTO: 12.3 % (ref 11.7–14.9)
PH, URINE: 6.5 (ref 5–8)
PHENCYCLIDINE, URINE: NEGATIVE
PLATELET # BLD: 320 K/CU MM (ref 140–440)
PMV BLD AUTO: 11.1 FL (ref 7.5–11.1)
POTASSIUM SERPL-SCNC: 3.3 MMOL/L (ref 3.5–5.1)
PROTEIN UA: ABNORMAL MG/DL
RAPID INFLUENZA  B AGN: NEGATIVE
RAPID INFLUENZA A AGN: NEGATIVE
RBC # BLD: 4.59 M/CU MM (ref 4.2–5.4)
RBC URINE: 8 /HPF (ref 0–6)
SALICYLATE LEVEL: <0.3 MG/DL (ref 15–30)
SARS-COV-2 RDRP RESP QL NAA+PROBE: NOT DETECTED
SEGMENTED NEUTROPHILS ABSOLUTE COUNT: 6.4 K/CU MM
SEGMENTED NEUTROPHILS RELATIVE PERCENT: 65.1 % (ref 36–66)
SODIUM BLD-SCNC: 137 MMOL/L (ref 135–145)
SOURCE: NORMAL
SPECIFIC GRAVITY UA: 1 (ref 1–1.03)
SPERM: ABNORMAL /HFP
SQUAMOUS EPITHELIAL: 3 /HPF
TOTAL IMMATURE NEUTOROPHIL: 0.05 K/CU MM
TOTAL NUCLEATED RBC: 0 K/CU MM
TOTAL PROTEIN: 7.5 GM/DL (ref 6.4–8.2)
TRANSITIONAL EPITHELIAL: <1 /HPF
TRICHOMONAS: ABNORMAL /HPF
UROBILINOGEN, URINE: 0.2 MG/DL (ref 0.2–1)
WBC # BLD: 9.9 K/CU MM (ref 4–10.5)
WBC UA: 6 /HPF (ref 0–5)

## 2023-03-09 PROCEDURE — 6370000000 HC RX 637 (ALT 250 FOR IP): Performed by: EMERGENCY MEDICINE

## 2023-03-09 PROCEDURE — 80061 LIPID PANEL: CPT

## 2023-03-09 PROCEDURE — 6370000000 HC RX 637 (ALT 250 FOR IP): Performed by: PHYSICIAN ASSISTANT

## 2023-03-09 PROCEDURE — 87186 SC STD MICRODIL/AGAR DIL: CPT

## 2023-03-09 PROCEDURE — 87086 URINE CULTURE/COLONY COUNT: CPT

## 2023-03-09 PROCEDURE — 87077 CULTURE AEROBIC IDENTIFY: CPT

## 2023-03-09 PROCEDURE — 84443 ASSAY THYROID STIM HORMONE: CPT

## 2023-03-09 PROCEDURE — 85025 COMPLETE CBC W/AUTO DIFF WBC: CPT

## 2023-03-09 PROCEDURE — 80307 DRUG TEST PRSMV CHEM ANLYZR: CPT

## 2023-03-09 PROCEDURE — 87804 INFLUENZA ASSAY W/OPTIC: CPT

## 2023-03-09 PROCEDURE — 80053 COMPREHEN METABOLIC PANEL: CPT

## 2023-03-09 PROCEDURE — G0480 DRUG TEST DEF 1-7 CLASSES: HCPCS

## 2023-03-09 PROCEDURE — 81001 URINALYSIS AUTO W/SCOPE: CPT

## 2023-03-09 PROCEDURE — 87635 SARS-COV-2 COVID-19 AMP PRB: CPT

## 2023-03-09 PROCEDURE — 99285 EMERGENCY DEPT VISIT HI MDM: CPT

## 2023-03-09 RX ORDER — NITROFURANTOIN 25; 75 MG/1; MG/1
100 CAPSULE ORAL 2 TIMES DAILY
Qty: 14 CAPSULE | Refills: 0 | Status: SHIPPED | OUTPATIENT
Start: 2023-03-09 | End: 2023-03-16

## 2023-03-09 RX ORDER — NITROFURANTOIN 25; 75 MG/1; MG/1
100 CAPSULE ORAL ONCE
Status: COMPLETED | OUTPATIENT
Start: 2023-03-09 | End: 2023-03-09

## 2023-03-09 RX ORDER — POTASSIUM CHLORIDE 20 MEQ/1
40 TABLET, EXTENDED RELEASE ORAL ONCE
Status: COMPLETED | OUTPATIENT
Start: 2023-03-09 | End: 2023-03-09

## 2023-03-09 RX ORDER — NITROFURANTOIN 25; 75 MG/1; MG/1
100 CAPSULE ORAL 2 TIMES DAILY
Qty: 14 CAPSULE | Refills: 0 | Status: SHIPPED | OUTPATIENT
Start: 2023-03-09 | End: 2023-03-09 | Stop reason: SDUPTHER

## 2023-03-09 RX ADMIN — POTASSIUM CHLORIDE 40 MEQ: 1500 TABLET, EXTENDED RELEASE ORAL at 02:16

## 2023-03-09 RX ADMIN — NITROFURANTOIN MONOHYDRATE/MACROCRYSTALS 100 MG: 75; 25 CAPSULE ORAL at 08:15

## 2023-03-09 ASSESSMENT — PAIN - FUNCTIONAL ASSESSMENT: PAIN_FUNCTIONAL_ASSESSMENT: NONE - DENIES PAIN

## 2023-03-09 NOTE — ED PROVIDER NOTES
Tino Bauman was checked out to me by Dr. Joanie Small. Please see his/her initial documentation for details of the patient's ED presentation, physical exam and completed studies. In brief, Tino Bauman is a 42 y.o. female that presents with psychosis and paranoia found running around the streets naked.     Labs  Results for orders placed or performed during the hospital encounter of 03/09/23   COVID-19, Rapid    Specimen: Nasopharyngeal   Result Value Ref Range    Source UNKNOWN     SARS-CoV-2, NAAT NOT DETECTED NOT DETECTED   Rapid Flu Swab    Specimen: Nasopharyngeal   Result Value Ref Range    Rapid Influenza A Ag NEGATIVE NEGATIVE    Rapid Influenza B Ag NEGATIVE NEGATIVE   CBC with Auto Differential   Result Value Ref Range    WBC 9.9 4.0 - 10.5 K/CU MM    RBC 4.59 4.2 - 5.4 M/CU MM    Hemoglobin 14.5 12.5 - 16.0 GM/DL    Hematocrit 42.7 37 - 47 %    MCV 93.0 78 - 100 FL    MCH 31.6 (H) 27 - 31 PG    MCHC 34.0 32.0 - 36.0 %    RDW 12.3 11.7 - 14.9 %    Platelets 007 370 - 043 K/CU MM    MPV 11.1 7.5 - 11.1 FL    Differential Type AUTOMATED DIFFERENTIAL     Segs Relative 65.1 36 - 66 %    Lymphocytes % 26.6 24 - 44 %    Monocytes % 5.7 (H) 0 - 4 %    Eosinophils % 1.4 0 - 3 %    Basophils % 0.7 0 - 1 %    Segs Absolute 6.4 K/CU MM    Lymphocytes Absolute 2.6 K/CU MM    Monocytes Absolute 0.6 K/CU MM    Eosinophils Absolute 0.1 K/CU MM    Basophils Absolute 0.1 K/CU MM    Nucleated RBC % 0.0 %    Total Nucleated RBC 0.0 K/CU MM    Total Immature Neutrophil 0.05 K/CU MM    Immature Neutrophil % 0.5 (H) 0 - 0.43 %   Comprehensive Metabolic Panel   Result Value Ref Range    Sodium 137 135 - 145 MMOL/L    Potassium 3.3 (L) 3.5 - 5.1 MMOL/L    Chloride 99 99 - 110 mMol/L    CO2 26 21 - 32 MMOL/L    BUN 13 6 - 23 MG/DL    Creatinine 0.8 0.6 - 1.1 MG/DL    Est, Glom Filt Rate >60 >60 mL/min/1.73m2    Glucose 165 (H) 70 - 99 MG/DL    Calcium 9.4 8.3 - 10.6 MG/DL    Albumin 4.6 3.4 - 5.0 GM/DL    Total Protein 7.5 6.4 - 8.2 GM/DL    Total Bilirubin 0.2 0.0 - 1.0 MG/DL    ALT 12 10 - 40 U/L    AST 20 15 - 37 IU/L    Alkaline Phosphatase 86 40 - 129 IU/L    Anion Gap 12 4 - 16   Ethanol   Result Value Ref Range    Alcohol Scrn <0.01 <5.69 %WT/VOL   Salicylate   Result Value Ref Range    Salicylate Lvl <6.0 (L) 15 - 30 MG/DL    DOSE AMOUNT DOSE AMT. GIVEN - UNKNOWN     DOSE TIME DOSE TIME GIVEN - UNKNOWN    Acetaminophen Level   Result Value Ref Range    Acetaminophen Level <5.0 (L) 15 - 30 ug/ml    DOSE AMOUNT DOSE AMT. GIVEN - UNKNOWN     DOSE TIME DOSE TIME GIVEN - UNKNOWN    Urinalysis   Result Value Ref Range    Color, UA YELLOW YELLOW    Clarity, UA CLOUDY (A) CLEAR    Glucose, Urine NEGATIVE NEGATIVE MG/DL    Bilirubin Urine NEGATIVE NEGATIVE MG/DL    Ketones, Urine NEGATIVE NEGATIVE MG/DL    Specific Gravity, UA 1.005 1.001 - 1.035    Blood, Urine LARGE (A) NEGATIVE    pH, Urine 6.5 5.0 - 8.0    Protein, UA TRACE (A) NEGATIVE MG/DL    Urobilinogen, Urine 0.2 0.2 - 1.0 MG/DL    Nitrite Urine, Quantitative POSITIVE (A) NEGATIVE    Leukocyte Esterase, Urine SMALL (A) NEGATIVE   Urine Drug Screen   Result Value Ref Range    Cannabinoid Scrn, Ur NEGATIVE NEGATIVE    Amphetamines NEGATIVE NEGATIVE    Cocaine Metabolite UNCONFIRMED POSITIVE (A) NEGATIVE    Benzodiazepine Screen, Urine NEGATIVE NEGATIVE    Barbiturate Screen, Ur NEGATIVE NEGATIVE    Opiates, Urine NEGATIVE NEGATIVE    Phencyclidine, Urine NEGATIVE NEGATIVE    Oxycodone NEGATIVE NEGATIVE   Microscopic Urinalysis   Result Value Ref Range    RBC, UA 8 (H) 0 - 6 /HPF    WBC, UA 6 (H) 0 - 5 /HPF    Bacteria, UA FEW (A) NEGATIVE /HPF    Squam Epithel, UA 3 /HPF    Trans Epithel, UA <1 /HPF    Mucus, UA OCCASIONAL (A) NEGATIVE HPF    Sperm, UA RARE /HFP    Trichomonas, UA NONE SEEN NONE SEEN /HPF    Hyaline Casts, UA 2 /LPF       MDM:  Patient endorsed to me pending placement for the above. Patient is mental health precautions with sitter.   On my review of patient's work-up she does have a nitrite positive urine with few bacteria and 6 whites and hematuria. I will cover with Macrobid given patient's allergies with first dose given here in the emergency department. Urine is sent for culture. Patient is otherwise medically cleared and has been evaluated by mental health and they are seeking placement for the above. Patient is pink slipped.    -----  Patient is accepted to [facility] mental health by Dr. Ansley Ruiz. Transportation is arranged and patient is transferred. Final Impression:  1. Psychosis, unspecified psychosis type ([facility] [address]    2. Acute cystitis with hematuria          Please note that portions of this note may have been complete with a voice recognition program.  Efforts were made to edit the dictations, but occasional words are mis-transcribed.           Chris Roberts MD  03/10/23 6274
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
## 2023-03-09 NOTE — ED NOTES
This RN takes assumption of care. Patient resting in bed, sitter at bedside. No distress evident.       Anna Mchugh RN  03/09/23 3402

## 2023-03-09 NOTE — ED PROVIDER NOTES
Patient is endorsed to me by Az Sutherland at 0300. In short, patient presented with  acute psychosis . The patient was placed in suicide precautions, patient's clothing and belongings were removed, documented and stored in the emergency department. Patient was reported to me to be medically cleared and hemodynamically stable awaiting behavioral health evaluation. Currently awaiting input from behavioral health specialist for disposition. After evaluation by Dr. Sonali Stanton, patient will be admitted to inpatient facility pending placement. 0600:a.m.  I have signed out [facility] Emergency Department care to Dr. Yuridia Hollins. We discussed the pertinent history, physical exam, completed/pending test results (if applicable) and current treatment plan. Please refer to his/her chart for the patients remaining Emergency Department course and final disposition.        Anastacio Michel MD  03/09/23 8816

## 2023-03-09 NOTE — ED NOTES
6424 MSW reviewing pt  chart. Pt has been seen by psychiatrist Dr Vianey Nevarez who is recommending inpatient psychiatric admission. All labs are resulted and [facility] has been called to work on placement. MSW will continue to follow up and assist as necessary. 2752 Call from [address] [place] [address] Dr, asking for  note and pink slip to be faxed.  MSW faxing   1110 MSW reaching out to [address] [place] [address] at [address] [place] [address]  re pt referral.   [phone] Pt accepted to [address] [place] [address]   Accepting Physician: Lorena Juliano: [phone] opt [phone]  MSW already faxed pink slip  1225 MSW scheduling transport   [address] [facility] [address]  MSW updating [address] [facility]  03/09/23 1235

## 2023-03-09 NOTE — ED NOTES
Patient moved to room 23. Pt remains calm and cooperative.  Provided pillow and blanket, sitter at bedside     [place], [address] [facility]  03/09/23 1207

## 2023-03-09 NOTE — ED NOTES
Pt remains sleeping, sitter at bedside     [facility], [address] [facility]  03/09/23 5463
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
## 2023-03-10 LAB
CHOLEST SERPL-MCNC: 174 MG/DL
HDLC SERPL-MCNC: 50 MG/DL
LDLC SERPL CALC-MCNC: 107 MG/DL
TRIGL SERPL-MCNC: 83 MG/DL
TSH SERPL DL<=0.005 MIU/L-ACNC: 3.3 UIU/ML (ref 0.27–4.2)

## 2023-03-11 ENCOUNTER — HOSPITAL ENCOUNTER (OUTPATIENT)
Age: 38
Setting detail: SPECIMEN
Discharge: HOME | End: 2023-03-11

## 2023-03-11 LAB
CULTURE: ABNORMAL
CULTURE: ABNORMAL
ESTIMATED AVERAGE GLUCOSE: 100 MG/DL
HBA1C MFR BLD: 5.1 % (ref 4.2–6.3)
Lab: ABNORMAL
SPECIMEN: ABNORMAL

## 2023-03-11 PROCEDURE — 9900360100 HC STAT COLLECTION FEE SNF

## 2023-03-11 PROCEDURE — 36415 COLL VENOUS BLD VENIPUNCTURE: CPT

## 2023-03-11 PROCEDURE — 83036 HEMOGLOBIN GLYCOSYLATED A1C: CPT

## 2023-03-15 ENCOUNTER — CLINICAL DOCUMENTATION (OUTPATIENT)
Dept: INTERNAL MEDICINE CLINIC | Age: 38
End: 2023-03-15

## 2023-05-15 ENCOUNTER — HOSPITAL ENCOUNTER (EMERGENCY)
Age: 38
Discharge: HOME | End: 2023-05-16
Attending: EMERGENCY MEDICINE
Payer: COMMERCIAL

## 2023-05-15 ENCOUNTER — APPOINTMENT (OUTPATIENT)
Dept: CT IMAGING | Age: 38
End: 2023-05-15
Payer: COMMERCIAL

## 2023-05-15 ENCOUNTER — HOSPITAL ENCOUNTER (EMERGENCY)
Age: 38
Discharge: HOME | End: 2023-05-15
Attending: EMERGENCY MEDICINE
Payer: COMMERCIAL

## 2023-05-15 VITALS
DIASTOLIC BLOOD PRESSURE: 73 MMHG | HEART RATE: 116 BPM | TEMPERATURE: 98.3 F | SYSTOLIC BLOOD PRESSURE: 124 MMHG | OXYGEN SATURATION: 96 % | RESPIRATION RATE: 16 BRPM | WEIGHT: 105 LBS | BODY MASS INDEX: 18.6 KG/M2

## 2023-05-15 VITALS
RESPIRATION RATE: 18 BRPM | TEMPERATURE: 97.7 F | HEART RATE: 96 BPM | SYSTOLIC BLOOD PRESSURE: 138 MMHG | BODY MASS INDEX: 18.6 KG/M2 | WEIGHT: 105 LBS | OXYGEN SATURATION: 99 % | DIASTOLIC BLOOD PRESSURE: 90 MMHG

## 2023-05-15 DIAGNOSIS — R44.1 VISUAL HALLUCINATIONS: Primary | ICD-10-CM

## 2023-05-15 DIAGNOSIS — F14.90 COCAINE USE: Primary | ICD-10-CM

## 2023-05-15 LAB
ALBUMIN SERPL-MCNC: 4.4 GM/DL (ref 3.4–5)
ALCOHOL SCREEN SERUM: <0.01 %WT/VOL
ALP BLD-CCNC: 90 IU/L (ref 40–128)
ALT SERPL-CCNC: 14 U/L (ref 10–40)
AMPHETAMINES: NEGATIVE
ANION GAP SERPL CALCULATED.3IONS-SCNC: 14 MMOL/L (ref 4–16)
AST SERPL-CCNC: 19 IU/L (ref 15–37)
BARBITURATE SCREEN URINE: NEGATIVE
BASOPHILS ABSOLUTE: 0.1 K/CU MM
BASOPHILS RELATIVE PERCENT: 0.6 % (ref 0–1)
BENZODIAZEPINE SCREEN, URINE: NEGATIVE
BILIRUB SERPL-MCNC: 0.2 MG/DL (ref 0–1)
BUN SERPL-MCNC: 8 MG/DL (ref 6–23)
CALCIUM SERPL-MCNC: 9 MG/DL (ref 8.3–10.6)
CANNABINOID SCREEN URINE: ABNORMAL
CHLORIDE BLD-SCNC: 100 MMOL/L (ref 99–110)
CO2: 24 MMOL/L (ref 21–32)
COCAINE METABOLITE: ABNORMAL
CREAT SERPL-MCNC: 0.9 MG/DL (ref 0.6–1.1)
DIFFERENTIAL TYPE: ABNORMAL
EOSINOPHILS ABSOLUTE: 0.1 K/CU MM
EOSINOPHILS RELATIVE PERCENT: 0.6 % (ref 0–3)
FENTANYL URINE: NEGATIVE
GFR SERPL CREATININE-BSD FRML MDRD: >60 ML/MIN/1.73M2
GLUCOSE SERPL-MCNC: 188 MG/DL (ref 70–99)
HCT VFR BLD CALC: 40.6 % (ref 37–47)
HEMOGLOBIN: 13.8 GM/DL (ref 12.5–16)
IMMATURE NEUTROPHIL %: 0.3 % (ref 0–0.43)
INFLUENZA A ANTIGEN: NOT DETECTED
INFLUENZA B ANTIGEN: NOT DETECTED
LYMPHOCYTES ABSOLUTE: 2.7 K/CU MM
LYMPHOCYTES RELATIVE PERCENT: 21.8 % (ref 24–44)
MCH RBC QN AUTO: 31.2 PG (ref 27–31)
MCHC RBC AUTO-ENTMCNC: 34 % (ref 32–36)
MCV RBC AUTO: 91.6 FL (ref 78–100)
MONOCYTES ABSOLUTE: 0.7 K/CU MM
MONOCYTES RELATIVE PERCENT: 5.2 % (ref 0–4)
NUCLEATED RBC %: 0 %
OPIATES, URINE: NEGATIVE
OXYCODONE: NEGATIVE
PDW BLD-RTO: 12.1 % (ref 11.7–14.9)
PLATELET # BLD: 335 K/CU MM (ref 140–440)
PMV BLD AUTO: 11.6 FL (ref 7.5–11.1)
POTASSIUM SERPL-SCNC: 3.1 MMOL/L (ref 3.5–5.1)
PRO-BNP: 230.1 PG/ML
RBC # BLD: 4.43 M/CU MM (ref 4.2–5.4)
SARS-COV-2 RDRP RESP QL NAA+PROBE: NOT DETECTED
SEGMENTED NEUTROPHILS ABSOLUTE COUNT: 8.9 K/CU MM
SEGMENTED NEUTROPHILS RELATIVE PERCENT: 71.5 % (ref 36–66)
SODIUM BLD-SCNC: 138 MMOL/L (ref 135–145)
SOURCE: NORMAL
TOTAL IMMATURE NEUTOROPHIL: 0.04 K/CU MM
TOTAL NUCLEATED RBC: 0 K/CU MM
TOTAL PROTEIN: 7.1 GM/DL (ref 6.4–8.2)
WBC # BLD: 12.5 K/CU MM (ref 4–10.5)

## 2023-05-15 PROCEDURE — 80307 DRUG TEST PRSMV CHEM ANLYZR: CPT

## 2023-05-15 PROCEDURE — 99284 EMERGENCY DEPT VISIT MOD MDM: CPT

## 2023-05-15 PROCEDURE — 87502 INFLUENZA DNA AMP PROBE: CPT

## 2023-05-15 PROCEDURE — 85025 COMPLETE CBC W/AUTO DIFF WBC: CPT

## 2023-05-15 PROCEDURE — 87635 SARS-COV-2 COVID-19 AMP PRB: CPT

## 2023-05-15 PROCEDURE — 83880 ASSAY OF NATRIURETIC PEPTIDE: CPT

## 2023-05-15 PROCEDURE — G0480 DRUG TEST DEF 1-7 CLASSES: HCPCS

## 2023-05-15 PROCEDURE — 99282 EMERGENCY DEPT VISIT SF MDM: CPT

## 2023-05-15 PROCEDURE — 80053 COMPREHEN METABOLIC PANEL: CPT

## 2023-05-15 PROCEDURE — 70450 CT HEAD/BRAIN W/O DYE: CPT

## 2023-05-15 ASSESSMENT — PAIN - FUNCTIONAL ASSESSMENT: PAIN_FUNCTIONAL_ASSESSMENT: NONE - DENIES PAIN
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
## 2023-05-15 NOTE — ED NOTES
Patient given discharge instructions medications and follow up care reviewed.  Patient voiced understanding         Arley Molina RN  05/15/23 9468

## 2023-05-15 NOTE — DISCHARGE INSTRUCTIONS
The use of illicit drugs can have negative consequences. Please refrain from the use of illicit drugs. If you develop any worsening concerning symptoms, please seek immediate medical evaluation.

## 2023-05-15 NOTE — ED NOTES
Per EMS patient was brought in for visual hallucinations, patient denies any SI or HI. Patient denies any hallucinations at this time.         Maira Pryor RN  05/15/23 2823

## 2023-05-16 NOTE — CARE COORDINATION
CM consult per Dr Betsy Valdez to assist with discharge instruction. Resources provided to pt to contact if interested in rehabilitation for substance abuse.  LINGRN/CM

## 2023-05-16 NOTE — DISCHARGE INSTRUCTIONS
Please follow-up with any resources identified during your visit today. If you develop any worsening concerning symptoms, please seek immediate medical evaluation.
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
## 2023-05-16 NOTE — ED PROVIDER NOTES
[address] ENCOUNTER      Pt Name: Kam Dumont  MRN: [id number]  [place] 1985  Date of evaluation: 5/15/2023  Provider: Devon Hamman, MD    CHIEF COMPLAINT       Chief Complaint   Patient presents with    Drug / Alcohol Assessment     Niece states using cocaine , hallucinations states \" everything is on fire\"            HISTORY OF PRESENT ILLNESS      Kam Dumont is a 42 y.o. female who presents to the emergency department  for   Chief Complaint   Patient presents with    Drug / Alcohol Assessment     Niece states using cocaine , hallucinations states \" everything is on fire\"          42 yof presents requesting resources for drug rehab. She has been abusing cocaine. She reprots that she relapsed in January, 2023. She had a period of sobriety before then. She has been to [facility]hab at [facility], [facility] and [facility]. When she uses cocaine, she has visual hallucinations of fire. She had those today. She currently is not having any visual hallucinations. She endorses cocaine use today. She presents voluntarily. She denies SI, HI or AVH. Nursing Notes, Triage Notes & Vital Signs were reviewed. REVIEW OF SYSTEMS    (2-9 systems for level 4, 10 or more for level 5)     Review of Systems   Psychiatric/Behavioral:  Negative for suicidal ideas. Except as noted above the remainder of the review of systems was reviewed and negative. PAST MEDICAL HISTORY     Past Medical History:   Diagnosis Date    Anxiety     Depression        Prior to Admission medications    Medication Sig Start Date End Date Taking?  Authorizing Provider   sertraline (ZOLOFT) 50 MG tablet Take 50 mg by mouth daily    Historical Provider, MD   ibuprofen (IBU) 600 MG tablet Take 1 tablet by mouth every 6 hours as needed for Pain 12/8/18 1/7/19  Mary Wade MD   Prenatal Multivit-Min-Fe-FA (PRENATAL #2 PO) Take by mouth

## 2023-07-27 ENCOUNTER — HOSPITAL ENCOUNTER (OUTPATIENT)
Age: 38
Setting detail: SPECIMEN
Discharge: HOME | End: 2023-07-27

## 2023-07-28 LAB
25(OH)D3 SERPL-MCNC: 22.7 NG/ML
ALBUMIN SERPL-MCNC: 4 G/DL (ref 3.5–5.2)
ALBUMIN/GLOB SERPL: 1.5 {RATIO} (ref 1–2.5)
ALP SERPL-CCNC: 90 U/L (ref 35–104)
ALT SERPL-CCNC: 13 U/L (ref 5–33)
ANION GAP SERPL CALCULATED.3IONS-SCNC: 12 MMOL/L (ref 9–17)
AST SERPL-CCNC: 23 U/L
BASOPHILS # BLD: 0.06 K/UL (ref 0–0.2)
BASOPHILS NFR BLD: 1 % (ref 0–2)
BILIRUB SERPL-MCNC: 0.2 MG/DL (ref 0.3–1.2)
BUN SERPL-MCNC: 9 MG/DL (ref 6–20)
CALCIUM SERPL-MCNC: 9.4 MG/DL (ref 8.6–10.4)
CHLORIDE SERPL-SCNC: 103 MMOL/L (ref 98–107)
CHOLEST SERPL-MCNC: 156 MG/DL
CHOLESTEROL/HDL RATIO: 3.1
CO2 SERPL-SCNC: 25 MMOL/L (ref 20–31)
CREAT SERPL-MCNC: 0.7 MG/DL (ref 0.5–0.9)
EOSINOPHIL # BLD: 0.13 K/UL (ref 0–0.44)
EOSINOPHILS RELATIVE PERCENT: 1 % (ref 1–4)
ERYTHROCYTE [DISTWIDTH] IN BLOOD BY AUTOMATED COUNT: 12.8 % (ref 11.8–14.4)
GFR SERPL CREATININE-BSD FRML MDRD: >60 ML/MIN/1.73M2
GLUCOSE SERPL-MCNC: 86 MG/DL (ref 70–99)
HCT VFR BLD AUTO: 38.7 % (ref 36.3–47.1)
HDLC SERPL-MCNC: 51 MG/DL
HGB BLD-MCNC: 12.5 G/DL (ref 11.9–15.1)
IMM GRANULOCYTES # BLD AUTO: <0.03 K/UL (ref 0–0.3)
IMM GRANULOCYTES NFR BLD: 0 %
LDLC SERPL CALC-MCNC: 82 MG/DL (ref 0–130)
LYMPHOCYTES NFR BLD: 2.21 K/UL (ref 1.1–3.7)
LYMPHOCYTES RELATIVE PERCENT: 24 % (ref 24–43)
MCH RBC QN AUTO: 31.3 PG (ref 25.2–33.5)
MCHC RBC AUTO-ENTMCNC: 32.3 G/DL (ref 28.4–34.8)
MCV RBC AUTO: 96.8 FL (ref 82.6–102.9)
MONOCYTES NFR BLD: 0.7 K/UL (ref 0.1–1.2)
MONOCYTES NFR BLD: 8 % (ref 3–12)
NEUTROPHILS NFR BLD: 66 % (ref 36–65)
NEUTS SEG NFR BLD: 5.99 K/UL (ref 1.5–8.1)
NRBC BLD-RTO: 0 PER 100 WBC
PLATELET # BLD AUTO: 303 K/UL (ref 138–453)
PMV BLD AUTO: 11.6 FL (ref 8.1–13.5)
POTASSIUM SERPL-SCNC: 4.3 MMOL/L (ref 3.7–5.3)
PROT SERPL-MCNC: 6.6 G/DL (ref 6.4–8.3)
RBC # BLD AUTO: 4 M/UL (ref 3.95–5.11)
SODIUM SERPL-SCNC: 140 MMOL/L (ref 135–144)
TRIGL SERPL-MCNC: 116 MG/DL
WBC OTHER # BLD: 9.1 K/UL (ref 3.5–11.3)

## 2024-05-13 ENCOUNTER — APPOINTMENT (OUTPATIENT)
Dept: CT IMAGING | Age: 39
DRG: 052 | End: 2024-05-13
Payer: COMMERCIAL

## 2024-05-13 ENCOUNTER — HOSPITAL ENCOUNTER (INPATIENT)
Age: 39
LOS: 2 days | Discharge: HOME | DRG: 052 | End: 2024-05-15
Attending: EMERGENCY MEDICINE | Admitting: INTERNAL MEDICINE
Payer: COMMERCIAL

## 2024-05-13 DIAGNOSIS — M62.82 NON-TRAUMATIC RHABDOMYOLYSIS: ICD-10-CM

## 2024-05-13 DIAGNOSIS — F23 ACUTE PSYCHOSIS (HCC): Primary | ICD-10-CM

## 2024-05-13 DIAGNOSIS — E87.20 METABOLIC ACIDOSIS: ICD-10-CM

## 2024-05-13 DIAGNOSIS — E87.6 HYPOKALEMIA: ICD-10-CM

## 2024-05-13 DIAGNOSIS — E86.0 DEHYDRATION: ICD-10-CM

## 2024-05-13 PROBLEM — R45.1 AGITATION: Status: ACTIVE | Noted: 2024-05-13

## 2024-05-13 LAB
ALBUMIN SERPL-MCNC: 4.3 G/DL (ref 3.5–5.2)
ALP SERPL-CCNC: 90 U/L (ref 35–104)
ALT SERPL-CCNC: 15 U/L (ref 5–33)
AMMONIA PLAS-SCNC: 47 UMOL/L (ref 11–51)
AMPHET UR QL SCN: POSITIVE
ANION GAP SERPL CALCULATED.3IONS-SCNC: 23 MMOL/L (ref 9–17)
APAP SERPL-MCNC: <5 UG/ML (ref 10–30)
ARTERIAL PATENCY WRIST A: ABNORMAL
AST SERPL-CCNC: 23 U/L
B-OH-BUTYR SERPL-MCNC: 0.12 MMOL/L (ref 0.02–0.27)
BACTERIA URNS QL MICRO: ABNORMAL
BARBITURATES UR QL SCN: NEGATIVE
BDY SITE: ABNORMAL
BENZODIAZ UR QL: NEGATIVE
BILIRUB SERPL-MCNC: 0.2 MG/DL (ref 0.3–1.2)
BILIRUB UR QL STRIP: NEGATIVE
BODY TEMPERATURE: 37
BUN SERPL-MCNC: 9 MG/DL (ref 6–20)
CALCIUM SERPL-MCNC: 9 MG/DL (ref 8.6–10.4)
CANNABINOIDS UR QL SCN: POSITIVE
CASTS #/AREA URNS LPF: ABNORMAL /LPF
CHLORIDE SERPL-SCNC: 101 MMOL/L (ref 98–107)
CK SERPL-CCNC: 266 U/L (ref 26–192)
CLARITY UR: ABNORMAL
CO2 SERPL-SCNC: 14 MMOL/L (ref 20–31)
COCAINE UR QL SCN: POSITIVE
COHGB MFR BLD: 2.9 %
COLOR UR: YELLOW
CREAT SERPL-MCNC: 1.2 MG/DL (ref 0.5–0.9)
EPI CELLS #/AREA URNS HPF: ABNORMAL /HPF
ERYTHROCYTE [DISTWIDTH] IN BLOOD BY AUTOMATED COUNT: 12.8 % (ref 11.5–14.9)
EST. AVERAGE GLUCOSE BLD GHB EST-MCNC: 100 MG/DL
ETHANOL PERCENT: <0.01 %
ETHANOL PERCENT: <0.01 %
ETHANOLAMINE SERPL-MCNC: <10 MG/DL
ETHANOLAMINE SERPL-MCNC: <10 MG/DL
FENTANYL UR QL: NEGATIVE
GFR, ESTIMATED: 59 ML/MIN/1.73M2
GLUCOSE BLD-MCNC: 81 MG/DL (ref 65–105)
GLUCOSE SERPL-MCNC: 258 MG/DL (ref 70–99)
GLUCOSE UR STRIP-MCNC: NEGATIVE MG/DL
HBA1C MFR BLD: 5.1 % (ref 4–6)
HCO3 VENOUS: 22.8 MMOL/L (ref 24–30)
HCT VFR BLD AUTO: 41.8 % (ref 36–46)
HGB BLD-MCNC: 13.7 G/DL (ref 12–16)
HGB UR QL STRIP.AUTO: ABNORMAL
KETONES UR STRIP-MCNC: NEGATIVE MG/DL
LACTATE BLDV-SCNC: 0.8 MMOL/L (ref 0.5–2.2)
LEUKOCYTE ESTERASE UR QL STRIP: ABNORMAL
MCH RBC QN AUTO: 31.3 PG (ref 26–34)
MCHC RBC AUTO-ENTMCNC: 32.7 G/DL (ref 31–37)
MCV RBC AUTO: 95.8 FL (ref 80–100)
METHADONE UR QL: NEGATIVE
METHEMOGLOBIN: 0.9 %
NEGATIVE BASE EXCESS, VEN: 2.6 MMOL/L (ref 0–2)
NITRITE UR QL STRIP: POSITIVE
O2 SAT, VEN: 77.2 %
OPIATES UR QL SCN: NEGATIVE
OSMOLALITY SERPL: 289 MOSM/KG (ref 275–295)
OXYCODONE UR QL SCN: NEGATIVE
PCO2, VEN: 40.5 MM HG (ref 39–55)
PCP UR QL SCN: NEGATIVE
PH UR STRIP: 6 [PH] (ref 5–8)
PH VENOUS: 7.36 (ref 7.32–7.42)
PLATELET # BLD AUTO: 327 K/UL (ref 150–450)
PMV BLD AUTO: 9.7 FL (ref 6–12)
PO2, VEN: 42.9 MM HG (ref 30–50)
POTASSIUM SERPL-SCNC: 3.2 MMOL/L (ref 3.7–5.3)
PROT SERPL-MCNC: 7.2 G/DL (ref 6.4–8.3)
PROT UR STRIP-MCNC: ABNORMAL MG/DL
RBC # BLD AUTO: 4.36 M/UL (ref 4–5.2)
RBC #/AREA URNS HPF: ABNORMAL /HPF
SALICYLATES SERPL-MCNC: <1 MG/DL (ref 3–10)
SODIUM SERPL-SCNC: 138 MMOL/L (ref 135–144)
SP GR UR STRIP: 1.01 (ref 1–1.03)
TEST INFORMATION: ABNORMAL
TRICYCLIC ANTIDEP,URINE: NEGATIVE
TSH SERPL DL<=0.05 MIU/L-ACNC: 3.75 UIU/ML (ref 0.3–5)
UROBILINOGEN UR STRIP-ACNC: NORMAL EU/DL (ref 0–1)
WBC #/AREA URNS HPF: ABNORMAL /HPF
WBC OTHER # BLD: 10.2 K/UL (ref 3.5–11)

## 2024-05-13 PROCEDURE — 80307 DRUG TEST PRSMV CHEM ANLYZR: CPT

## 2024-05-13 PROCEDURE — 99285 EMERGENCY DEPT VISIT HI MDM: CPT

## 2024-05-13 PROCEDURE — 84443 ASSAY THYROID STIM HORMONE: CPT

## 2024-05-13 PROCEDURE — 6370000000 HC RX 637 (ALT 250 FOR IP): Performed by: INTERNAL MEDICINE

## 2024-05-13 PROCEDURE — 96372 THER/PROPH/DIAG INJ SC/IM: CPT

## 2024-05-13 PROCEDURE — 82550 ASSAY OF CK (CPK): CPT

## 2024-05-13 PROCEDURE — 2580000003 HC RX 258: Performed by: EMERGENCY MEDICINE

## 2024-05-13 PROCEDURE — 70450 CT HEAD/BRAIN W/O DYE: CPT

## 2024-05-13 PROCEDURE — 99223 1ST HOSP IP/OBS HIGH 75: CPT | Performed by: INTERNAL MEDICINE

## 2024-05-13 PROCEDURE — 80143 DRUG ASSAY ACETAMINOPHEN: CPT

## 2024-05-13 PROCEDURE — 83930 ASSAY OF BLOOD OSMOLALITY: CPT

## 2024-05-13 PROCEDURE — 2580000003 HC RX 258: Performed by: INTERNAL MEDICINE

## 2024-05-13 PROCEDURE — 85027 COMPLETE CBC AUTOMATED: CPT

## 2024-05-13 PROCEDURE — 82010 KETONE BODYS QUAN: CPT

## 2024-05-13 PROCEDURE — 2060000000 HC ICU INTERMEDIATE R&B

## 2024-05-13 PROCEDURE — 83605 ASSAY OF LACTIC ACID: CPT

## 2024-05-13 PROCEDURE — 82140 ASSAY OF AMMONIA: CPT

## 2024-05-13 PROCEDURE — 82805 BLOOD GASES W/O2 SATURATION: CPT

## 2024-05-13 PROCEDURE — 82947 ASSAY GLUCOSE BLOOD QUANT: CPT

## 2024-05-13 PROCEDURE — 36415 COLL VENOUS BLD VENIPUNCTURE: CPT

## 2024-05-13 PROCEDURE — 80053 COMPREHEN METABOLIC PANEL: CPT

## 2024-05-13 PROCEDURE — 93005 ELECTROCARDIOGRAM TRACING: CPT | Performed by: INTERNAL MEDICINE

## 2024-05-13 PROCEDURE — 80179 DRUG ASSAY SALICYLATE: CPT

## 2024-05-13 PROCEDURE — G0480 DRUG TEST DEF 1-7 CLASSES: HCPCS

## 2024-05-13 PROCEDURE — 96374 THER/PROPH/DIAG INJ IV PUSH: CPT

## 2024-05-13 PROCEDURE — 99254 IP/OBS CNSLTJ NEW/EST MOD 60: CPT | Performed by: NURSE PRACTITIONER

## 2024-05-13 PROCEDURE — 83036 HEMOGLOBIN GLYCOSYLATED A1C: CPT

## 2024-05-13 PROCEDURE — 81001 URINALYSIS AUTO W/SCOPE: CPT

## 2024-05-13 PROCEDURE — 6360000002 HC RX W HCPCS: Performed by: EMERGENCY MEDICINE

## 2024-05-13 PROCEDURE — 82800 BLOOD PH: CPT

## 2024-05-13 RX ORDER — ARIPIPRAZOLE 5 MG/1
5 TABLET ORAL DAILY
Status: DISCONTINUED | OUTPATIENT
Start: 2024-05-13 | End: 2024-05-15 | Stop reason: HOSPADM

## 2024-05-13 RX ORDER — POTASSIUM CHLORIDE 7.45 MG/ML
10 INJECTION INTRAVENOUS ONCE
Status: COMPLETED | OUTPATIENT
Start: 2024-05-13 | End: 2024-05-13

## 2024-05-13 RX ORDER — LORAZEPAM 2 MG/ML
2 INJECTION INTRAMUSCULAR ONCE
Status: COMPLETED | OUTPATIENT
Start: 2024-05-13 | End: 2024-05-13

## 2024-05-13 RX ORDER — 0.9 % SODIUM CHLORIDE 0.9 %
1000 INTRAVENOUS SOLUTION INTRAVENOUS ONCE
Status: COMPLETED | OUTPATIENT
Start: 2024-05-13 | End: 2024-05-13

## 2024-05-13 RX ORDER — POTASSIUM CHLORIDE 7.45 MG/ML
10 INJECTION INTRAVENOUS PRN
Status: DISCONTINUED | OUTPATIENT
Start: 2024-05-13 | End: 2024-05-15 | Stop reason: HOSPADM

## 2024-05-13 RX ORDER — SODIUM CHLORIDE 450 MG/100ML
INJECTION, SOLUTION INTRAVENOUS CONTINUOUS
Status: DISCONTINUED | OUTPATIENT
Start: 2024-05-13 | End: 2024-05-15 | Stop reason: HOSPADM

## 2024-05-13 RX ORDER — ACETAMINOPHEN 650 MG/1
650 SUPPOSITORY RECTAL EVERY 6 HOURS PRN
Status: DISCONTINUED | OUTPATIENT
Start: 2024-05-13 | End: 2024-05-15 | Stop reason: HOSPADM

## 2024-05-13 RX ORDER — SODIUM CHLORIDE 0.9 % (FLUSH) 0.9 %
5-40 SYRINGE (ML) INJECTION PRN
Status: DISCONTINUED | OUTPATIENT
Start: 2024-05-13 | End: 2024-05-15 | Stop reason: HOSPADM

## 2024-05-13 RX ORDER — SODIUM CHLORIDE 0.9 % (FLUSH) 0.9 %
5-40 SYRINGE (ML) INJECTION EVERY 12 HOURS SCHEDULED
Status: DISCONTINUED | OUTPATIENT
Start: 2024-05-13 | End: 2024-05-15 | Stop reason: HOSPADM

## 2024-05-13 RX ORDER — NITROFURANTOIN 25; 75 MG/1; MG/1
100 CAPSULE ORAL EVERY 12 HOURS SCHEDULED
Status: DISCONTINUED | OUTPATIENT
Start: 2024-05-13 | End: 2024-05-15 | Stop reason: HOSPADM

## 2024-05-13 RX ORDER — DEXTROSE MONOHYDRATE 100 MG/ML
INJECTION, SOLUTION INTRAVENOUS CONTINUOUS PRN
Status: DISCONTINUED | OUTPATIENT
Start: 2024-05-13 | End: 2024-05-15 | Stop reason: HOSPADM

## 2024-05-13 RX ORDER — POTASSIUM CHLORIDE 20 MEQ/1
40 TABLET, EXTENDED RELEASE ORAL PRN
Status: DISCONTINUED | OUTPATIENT
Start: 2024-05-13 | End: 2024-05-15 | Stop reason: HOSPADM

## 2024-05-13 RX ORDER — LORAZEPAM 2 MG/ML
1 INJECTION INTRAMUSCULAR
Status: DISCONTINUED | OUTPATIENT
Start: 2024-05-13 | End: 2024-05-15 | Stop reason: HOSPADM

## 2024-05-13 RX ORDER — ONDANSETRON 2 MG/ML
4 INJECTION INTRAMUSCULAR; INTRAVENOUS EVERY 6 HOURS PRN
Status: DISCONTINUED | OUTPATIENT
Start: 2024-05-13 | End: 2024-05-15 | Stop reason: HOSPADM

## 2024-05-13 RX ORDER — ACETAMINOPHEN 325 MG/1
650 TABLET ORAL EVERY 6 HOURS PRN
Status: DISCONTINUED | OUTPATIENT
Start: 2024-05-13 | End: 2024-05-15 | Stop reason: HOSPADM

## 2024-05-13 RX ORDER — SODIUM CHLORIDE 9 MG/ML
INJECTION, SOLUTION INTRAVENOUS PRN
Status: DISCONTINUED | OUTPATIENT
Start: 2024-05-13 | End: 2024-05-15 | Stop reason: HOSPADM

## 2024-05-13 RX ORDER — INSULIN LISPRO 100 [IU]/ML
0-4 INJECTION, SOLUTION INTRAVENOUS; SUBCUTANEOUS
Status: DISCONTINUED | OUTPATIENT
Start: 2024-05-13 | End: 2024-05-15 | Stop reason: HOSPADM

## 2024-05-13 RX ORDER — DIPHENHYDRAMINE HYDROCHLORIDE 50 MG/ML
50 INJECTION INTRAMUSCULAR; INTRAVENOUS ONCE
Status: COMPLETED | OUTPATIENT
Start: 2024-05-13 | End: 2024-05-13

## 2024-05-13 RX ORDER — INSULIN LISPRO 100 [IU]/ML
0-4 INJECTION, SOLUTION INTRAVENOUS; SUBCUTANEOUS NIGHTLY
Status: DISCONTINUED | OUTPATIENT
Start: 2024-05-13 | End: 2024-05-15 | Stop reason: HOSPADM

## 2024-05-13 RX ORDER — POLYETHYLENE GLYCOL 3350 17 G/17G
17 POWDER, FOR SOLUTION ORAL DAILY PRN
Status: DISCONTINUED | OUTPATIENT
Start: 2024-05-13 | End: 2024-05-15 | Stop reason: HOSPADM

## 2024-05-13 RX ORDER — ENOXAPARIN SODIUM 100 MG/ML
40 INJECTION SUBCUTANEOUS DAILY
Status: DISCONTINUED | OUTPATIENT
Start: 2024-05-13 | End: 2024-05-15 | Stop reason: HOSPADM

## 2024-05-13 RX ORDER — ONDANSETRON 4 MG/1
4 TABLET, ORALLY DISINTEGRATING ORAL EVERY 8 HOURS PRN
Status: DISCONTINUED | OUTPATIENT
Start: 2024-05-13 | End: 2024-05-15 | Stop reason: HOSPADM

## 2024-05-13 RX ORDER — HALOPERIDOL 5 MG/ML
5 INJECTION INTRAMUSCULAR ONCE
Status: COMPLETED | OUTPATIENT
Start: 2024-05-13 | End: 2024-05-13

## 2024-05-13 RX ORDER — ARIPIPRAZOLE 5 MG/1
5 TABLET ORAL DAILY
COMMUNITY

## 2024-05-13 RX ORDER — MAGNESIUM SULFATE HEPTAHYDRATE 40 MG/ML
2000 INJECTION, SOLUTION INTRAVENOUS PRN
Status: DISCONTINUED | OUTPATIENT
Start: 2024-05-13 | End: 2024-05-15 | Stop reason: HOSPADM

## 2024-05-13 RX ADMIN — DIPHENHYDRAMINE HYDROCHLORIDE 50 MG: 50 INJECTION INTRAMUSCULAR; INTRAVENOUS at 05:34

## 2024-05-13 RX ADMIN — SODIUM CHLORIDE 1000 ML: 9 INJECTION, SOLUTION INTRAVENOUS at 07:38

## 2024-05-13 RX ADMIN — NITROFURANTOIN MONOHYDRATE/MACROCRYSTALS 100 MG: 75; 25 CAPSULE ORAL at 17:39

## 2024-05-13 RX ADMIN — HALOPERIDOL LACTATE 5 MG: 5 INJECTION, SOLUTION INTRAMUSCULAR at 05:34

## 2024-05-13 RX ADMIN — LORAZEPAM 2 MG: 2 INJECTION INTRAMUSCULAR; INTRAVENOUS at 05:35

## 2024-05-13 RX ADMIN — POTASSIUM CHLORIDE 10 MEQ: 7.46 INJECTION, SOLUTION INTRAVENOUS at 07:10

## 2024-05-13 RX ADMIN — SODIUM CHLORIDE: 4.5 INJECTION, SOLUTION INTRAVENOUS at 23:31

## 2024-05-13 RX ADMIN — SODIUM CHLORIDE 1000 ML: 9 INJECTION, SOLUTION INTRAVENOUS at 05:55

## 2024-05-13 RX ADMIN — ARIPIPRAZOLE 5 MG: 5 TABLET ORAL at 17:39

## 2024-05-13 RX ADMIN — SODIUM CHLORIDE: 4.5 INJECTION, SOLUTION INTRAVENOUS at 12:18

## 2024-05-13 ASSESSMENT — PAIN - FUNCTIONAL ASSESSMENT: PAIN_FUNCTIONAL_ASSESSMENT: NONE - DENIES PAIN

## 2024-05-13 NOTE — ED NOTES
Patient more calm now, resting in bed.    Area H Indication Text: Tumors in this location are included in Area H (eyelids, eyebrows, nose, lips, chin, ear, pre-auricular, post-auricular, temple, genitalia, hands, feet, ankles and areola).  Tissue conservation is critical in these anatomic locations. son

## 2024-05-13 NOTE — FLOWSHEET NOTE
05/13/24 1502   Treatment Team Notification   Reason for Communication Review case   Name of Team Member Notified Dr Dong   Treatment Team Role Attending Provider   Method of Communication Secure Message   Response Waiting for response   Notification Time 1501     new ER pt admit. meds reconciled. there's no orders on her yet.
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
## 2024-05-13 NOTE — ED NOTES
Report given to JULIAN Avila from U.   Report method by phone   The following was reviewed with receiving RN:   Current vital signs:  /70   Pulse (!) 108   Temp 99.1 °F (37.3 °C) (Oral)   Resp 15   Ht 1.6 m (5' 3\")   Wt 55.3 kg (122 lb)   SpO2 95%   BMI 21.61 kg/m²                      Any medication or safety alerts were reviewed. Any pending diagnostics and notifications were also reviewed, as well as any safety concerns or issues, abnormal labs, abnormal imaging, and abnormal assessment findings. Questions were answered.

## 2024-05-13 NOTE — FLOWSHEET NOTE
05/13/24 1558   Treatment Team Notification   Reason for Communication Review case   Name of Team Member Notified Dr Dong   Treatment Team Role Attending Provider   Method of Communication Secure Message   Response Waiting for response   Notification Time 4600     just letting you know UA abnormalities   [age] yr old f il lumbar pain scale of 8 needs something the pain is horrible  ----- Message from Stephanie Cm sent at 2024  9:24 AM CST -----  Patient Name: Kelsey Randall    Specialist or PCP Name: Dr. Robles December    Symptoms: 85 yr old f il lumbar pain scale of 8 needs something the pain is horrible    Pregnant (females aged 13-60. If Yes, how long?) : no    Call Back # : [phone]    Which State are you currently located in?: [place]    Name of Clinic Site / Acct# : [facility]    Use following scripting for patients waiting for a callback:   \"Nurse callback times vary based on call volumes; please be aware the return phone call may come from an unidentified or out of state phone number. If your symptoms worsen or become life threatening while waiting, you should seek immediate assistance by calling 911 or going to the ER for evaluation.\"
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
## 2024-05-13 NOTE — ED PROVIDER NOTES
lactate (HALDOL) injection 5 mg    sodium chloride 0.9 % bolus 1,000 mL    potassium chloride 10 mEq/100 mL IVPB (Peripheral Line)    sodium chloride 0.9 % bolus 1,000 mL     DISCHARGE PRESCRIPTIONS:  New Prescriptions    No medications on file     PHYSICIAN CONSULTS ORDERED THIS ENCOUNTER:  IP CONSULT TO PRIMARY CARE PROVIDER  IP CONSULT TO PSYCHIATRY    FINAL IMPRESSION      1. Acute psychosis (HCC)    2. Dehydration    3. Hypokalemia    4. Metabolic acidosis    5. Non-traumatic rhabdomyolysis          DISPOSITION/PLAN   DISPOSITION Admitted 05/13/2024 07:29:10 AM      PATIENT REFERREDTO:  No follow-up provider specified.    DISCHARGEMEDICATIONS:  New Prescriptions    No medications on file       (Please note that portions of this note were completed with a voice recognition program.  Efforts were made to edit thedictations but occasionally words are mis-transcribed.)    Juan Miguel Renae MD  Attending Emergency Physician                        Juan Miguel Renae MD  05/13/24 0729       Juan Miguel Renae MD  05/13/24 0740

## 2024-05-14 PROBLEM — F15.20 SEVERE STIMULANT USE DISORDER (HCC): Status: ACTIVE | Noted: 2024-05-14

## 2024-05-14 LAB
ANION GAP SERPL CALCULATED.3IONS-SCNC: 15 MMOL/L (ref 9–17)
BASOPHILS # BLD: 0 K/UL (ref 0–0.2)
BASOPHILS # BLD: 0.1 K/UL (ref 0–0.2)
BASOPHILS NFR BLD: 1 % (ref 0–2)
BASOPHILS NFR BLD: 1 % (ref 0–2)
BUN SERPL-MCNC: 6 MG/DL (ref 6–20)
CALCIUM SERPL-MCNC: 8.1 MG/DL (ref 8.6–10.4)
CHLORIDE SERPL-SCNC: 106 MMOL/L (ref 98–107)
CK SERPL-CCNC: 774 U/L (ref 26–192)
CO2 SERPL-SCNC: 17 MMOL/L (ref 20–31)
CREAT SERPL-MCNC: 0.6 MG/DL (ref 0.5–0.9)
EKG ATRIAL RATE: 109 BPM
EKG P AXIS: 63 DEGREES
EKG P-R INTERVAL: 112 MS
EKG Q-T INTERVAL: 360 MS
EKG QRS DURATION: 76 MS
EKG QTC CALCULATION (BAZETT): 484 MS
EKG R AXIS: 37 DEGREES
EKG T AXIS: 54 DEGREES
EKG VENTRICULAR RATE: 109 BPM
EOSINOPHIL # BLD: 0.1 K/UL (ref 0–0.4)
EOSINOPHIL # BLD: 0.1 K/UL (ref 0–0.4)
EOSINOPHILS RELATIVE PERCENT: 1 % (ref 0–4)
EOSINOPHILS RELATIVE PERCENT: 1 % (ref 0–4)
ERYTHROCYTE [DISTWIDTH] IN BLOOD BY AUTOMATED COUNT: 12.8 % (ref 11.5–14.9)
ERYTHROCYTE [DISTWIDTH] IN BLOOD BY AUTOMATED COUNT: 12.9 % (ref 11.5–14.9)
GFR, ESTIMATED: >90 ML/MIN/1.73M2
GLUCOSE BLD-MCNC: 100 MG/DL (ref 65–105)
GLUCOSE BLD-MCNC: 101 MG/DL (ref 65–105)
GLUCOSE BLD-MCNC: 108 MG/DL (ref 65–105)
GLUCOSE BLD-MCNC: 61 MG/DL (ref 65–105)
GLUCOSE BLD-MCNC: 81 MG/DL (ref 65–105)
GLUCOSE SERPL-MCNC: 70 MG/DL (ref 70–99)
HCT VFR BLD AUTO: 37 % (ref 36–46)
HCT VFR BLD AUTO: 38.4 % (ref 36–46)
HGB BLD-MCNC: 12.5 G/DL (ref 12–16)
HGB BLD-MCNC: 13.1 G/DL (ref 12–16)
LYMPHOCYTES NFR BLD: 1.5 K/UL (ref 1–4.8)
LYMPHOCYTES NFR BLD: 2.4 K/UL (ref 1–4.8)
LYMPHOCYTES RELATIVE PERCENT: 21 % (ref 24–44)
LYMPHOCYTES RELATIVE PERCENT: 23 % (ref 24–44)
MAGNESIUM SERPL-MCNC: 1.9 MG/DL (ref 1.6–2.6)
MCH RBC QN AUTO: 32.3 PG (ref 26–34)
MCH RBC QN AUTO: 32.7 PG (ref 26–34)
MCHC RBC AUTO-ENTMCNC: 33.8 G/DL (ref 31–37)
MCHC RBC AUTO-ENTMCNC: 34.1 G/DL (ref 31–37)
MCV RBC AUTO: 94.6 FL (ref 80–100)
MCV RBC AUTO: 96.8 FL (ref 80–100)
MONOCYTES NFR BLD: 0.5 K/UL (ref 0.1–1.3)
MONOCYTES NFR BLD: 0.8 K/UL (ref 0.1–1.3)
MONOCYTES NFR BLD: 7 % (ref 1–7)
MONOCYTES NFR BLD: 8 % (ref 1–7)
NEUTROPHILS NFR BLD: 67 % (ref 36–66)
NEUTROPHILS NFR BLD: 70 % (ref 36–66)
NEUTS SEG NFR BLD: 4.4 K/UL (ref 1.3–9.1)
NEUTS SEG NFR BLD: 8 K/UL (ref 1.3–9.1)
PLATELET # BLD AUTO: 218 K/UL (ref 150–450)
PLATELET # BLD AUTO: 222 K/UL (ref 150–450)
PMV BLD AUTO: 9.5 FL (ref 6–12)
PMV BLD AUTO: 9.9 FL (ref 6–12)
POTASSIUM SERPL-SCNC: 3.3 MMOL/L (ref 3.7–5.3)
RBC # BLD AUTO: 3.82 M/UL (ref 4–5.2)
RBC # BLD AUTO: 4.06 M/UL (ref 4–5.2)
SODIUM SERPL-SCNC: 138 MMOL/L (ref 135–144)
WBC OTHER # BLD: 11.4 K/UL (ref 3.5–11)
WBC OTHER # BLD: 6.5 K/UL (ref 3.5–11)

## 2024-05-14 PROCEDURE — 99222 1ST HOSP IP/OBS MODERATE 55: CPT | Performed by: PSYCHIATRY & NEUROLOGY

## 2024-05-14 PROCEDURE — 36415 COLL VENOUS BLD VENIPUNCTURE: CPT

## 2024-05-14 PROCEDURE — 2580000003 HC RX 258: Performed by: INTERNAL MEDICINE

## 2024-05-14 PROCEDURE — 85025 COMPLETE CBC W/AUTO DIFF WBC: CPT

## 2024-05-14 PROCEDURE — 82947 ASSAY GLUCOSE BLOOD QUANT: CPT

## 2024-05-14 PROCEDURE — 99232 SBSQ HOSP IP/OBS MODERATE 35: CPT | Performed by: INTERNAL MEDICINE

## 2024-05-14 PROCEDURE — 6370000000 HC RX 637 (ALT 250 FOR IP): Performed by: INTERNAL MEDICINE

## 2024-05-14 PROCEDURE — 6360000002 HC RX W HCPCS: Performed by: INTERNAL MEDICINE

## 2024-05-14 PROCEDURE — 2060000000 HC ICU INTERMEDIATE R&B

## 2024-05-14 PROCEDURE — 82550 ASSAY OF CK (CPK): CPT

## 2024-05-14 PROCEDURE — 80048 BASIC METABOLIC PNL TOTAL CA: CPT

## 2024-05-14 PROCEDURE — 93010 ELECTROCARDIOGRAM REPORT: CPT | Performed by: INTERNAL MEDICINE

## 2024-05-14 PROCEDURE — 83735 ASSAY OF MAGNESIUM: CPT

## 2024-05-14 RX ADMIN — SODIUM CHLORIDE: 4.5 INJECTION, SOLUTION INTRAVENOUS at 21:09

## 2024-05-14 RX ADMIN — NITROFURANTOIN MONOHYDRATE/MACROCRYSTALS 100 MG: 75; 25 CAPSULE ORAL at 08:59

## 2024-05-14 RX ADMIN — SODIUM CHLORIDE, PRESERVATIVE FREE 10 ML: 5 INJECTION INTRAVENOUS at 08:59

## 2024-05-14 RX ADMIN — ENOXAPARIN SODIUM 40 MG: 100 INJECTION SUBCUTANEOUS at 09:02

## 2024-05-14 RX ADMIN — NITROFURANTOIN MONOHYDRATE/MACROCRYSTALS 100 MG: 75; 25 CAPSULE ORAL at 20:07

## 2024-05-14 RX ADMIN — ARIPIPRAZOLE 5 MG: 5 TABLET ORAL at 08:59

## 2024-05-14 RX ADMIN — SODIUM CHLORIDE: 4.5 INJECTION, SOLUTION INTRAVENOUS at 10:24

## 2024-05-14 NOTE — CARE COORDINATION
Case Management Assessment  Initial Evaluation    Date/Time of Evaluation: 5/14/2024 12:23 PM  Assessment Completed by: Joann Austin RN    If patient is discharged prior to next notation, then this note serves as note for discharge by case management.    Patient Name: Ioana Erazo                   YOB: 1985  Diagnosis: Dehydration [E86.0]  Hypokalemia [E87.6]  Agitation [R45.1]  Metabolic acidosis [E87.20]  Acute psychosis (HCC) [F23]  Non-traumatic rhabdomyolysis [M62.82]                   Date / Time: 5/13/2024  5:30 AM    Patient Admission Status: Inpatient   Readmission Risk (Low < 19, Mod (19-27), High > 27): Readmission Risk Score: 7.9    Current PCP: Leonor Dawson MD  PCP verified by CM? Yes    Chart Reviewed: Yes      History Provided by: Patient  Patient Orientation: Alert and Oriented    Patient Cognition: Alert    Hospitalization in the last 30 days (Readmission):  No    If yes, Readmission Assessment in  Navigator will be completed.    Advance Directives:      Code Status: Full Code   Patient's Primary Decision Maker is: Patient Declined (Legal Next of Kin Remains as Decision Maker)      Discharge Planning:    Patient lives with: Friends Type of Home: House  Primary Care Giver: Self  Patient Support Systems include: Friends/Neighbors, Family Members   Current Financial resources: Medicaid  Current community resources: None  Current services prior to admission: None            Current DME:              Type of Home Care services:  None    ADLS  Prior functional level: Independent in ADLs/IADLs  Current functional level: Independent in ADLs/IADLs    PT AM-PAC:   /24  OT AM-PAC:   /24    Family can provide assistance at DC: No  Would you like Case Management to discuss the discharge plan with any other family members/significant others, and if so, who? No  Plans to Return to Present Housing: Yes  Other Identified Issues/Barriers to RETURNING to current housing: no barriers

## 2024-05-15 VITALS
SYSTOLIC BLOOD PRESSURE: 95 MMHG | TEMPERATURE: 98.4 F | OXYGEN SATURATION: 97 % | DIASTOLIC BLOOD PRESSURE: 73 MMHG | HEIGHT: 63 IN | HEART RATE: 115 BPM | BODY MASS INDEX: 22.5 KG/M2 | RESPIRATION RATE: 18 BRPM | WEIGHT: 126.98 LBS

## 2024-05-15 LAB
ALBUMIN: 3.3 G/DL (ref 3.5–5.2)
ANION GAP SERPL CALCULATED.3IONS-SCNC: 10 MMOL/L (ref 9–17)
BUN SERPL-MCNC: 3 MG/DL (ref 6–20)
CALCIUM SERPL-MCNC: 8.3 MG/DL (ref 8.6–10.4)
CHLORIDE SERPL-SCNC: 107 MMOL/L (ref 98–107)
CK SERPL-CCNC: 541 U/L (ref 26–192)
CO2 SERPL-SCNC: 22 MMOL/L (ref 20–31)
CREAT SERPL-MCNC: 0.5 MG/DL (ref 0.5–0.9)
GFR, ESTIMATED: >90 ML/MIN/1.73M2
GLUCOSE BLD-MCNC: 97 MG/DL (ref 65–105)
GLUCOSE BLD-MCNC: 97 MG/DL (ref 65–105)
GLUCOSE SERPL-MCNC: 97 MG/DL (ref 70–99)
PHOSPHATE SERPL-MCNC: 2.5 MG/DL (ref 2.6–4.5)
POTASSIUM SERPL-SCNC: 3.4 MMOL/L (ref 3.7–5.3)
SODIUM SERPL-SCNC: 139 MMOL/L (ref 135–144)

## 2024-05-15 PROCEDURE — 82550 ASSAY OF CK (CPK): CPT

## 2024-05-15 PROCEDURE — 80069 RENAL FUNCTION PANEL: CPT

## 2024-05-15 PROCEDURE — 82947 ASSAY GLUCOSE BLOOD QUANT: CPT

## 2024-05-15 PROCEDURE — 6370000000 HC RX 637 (ALT 250 FOR IP): Performed by: INTERNAL MEDICINE

## 2024-05-15 PROCEDURE — 36415 COLL VENOUS BLD VENIPUNCTURE: CPT

## 2024-05-15 RX ORDER — POTASSIUM CHLORIDE 20 MEQ/1
20 TABLET, EXTENDED RELEASE ORAL ONCE
Status: COMPLETED | OUTPATIENT
Start: 2024-05-15 | End: 2024-05-15

## 2024-05-15 RX ORDER — NITROFURANTOIN 25; 75 MG/1; MG/1
100 CAPSULE ORAL EVERY 12 HOURS SCHEDULED
Qty: 7 CAPSULE | Refills: 0 | Status: SHIPPED | OUTPATIENT
Start: 2024-05-15 | End: 2024-05-19

## 2024-05-15 RX ADMIN — NITROFURANTOIN MONOHYDRATE/MACROCRYSTALS 100 MG: 75; 25 CAPSULE ORAL at 09:30

## 2024-05-15 RX ADMIN — ARIPIPRAZOLE 5 MG: 5 TABLET ORAL at 09:30

## 2024-05-15 RX ADMIN — POTASSIUM CHLORIDE 20 MEQ: 1500 TABLET, EXTENDED RELEASE ORAL at 09:30

## 2024-05-15 RX ADMIN — POTASSIUM & SODIUM PHOSPHATES POWDER PACK 280-160-250 MG 250 MG: 280-160-250 PACK at 09:32
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
## 2024-05-15 NOTE — PLAN OF CARE
Problem: Discharge Planning  Goal: Discharge to home or other facility with appropriate resources  5/14/2024 0049 by Carmelina Goldstein RN  Outcome: Progressing     Problem: Risk for Elopement  Goal: Patient will not exit the unit/facility without proper excort  5/14/2024 0049 by Carmelina Goldstein RN  Outcome: Progressing  Flowsheets (Taken 5/13/2024 2000)  Nursing Interventions for Elopement Risk:   Collaborate with family members/caregivers to mitigate the elopement risk   Collaborate with treatment team for drug withdrawal symptoms treatment     Problem: Safety - Adult  Goal: Free from fall injury  5/14/2024 0049 by Cramelina Goldstein RN  Outcome: Progressing     Problem: Anxiety  Goal: Will report anxiety at manageable levels  Description: INTERVENTIONS:  1. Administer medication as ordered  2. Teach and rehearse alternative coping skills  3. Provide emotional support with 1:1 interaction with staff  5/14/2024 0049 by Carmelina Goldstein RN  Outcome: Progressing     Problem: Depression/Self Harm  Goal: Effect of psychiatric condition will be minimized and patient will be protected from self harm  Description: INTERVENTIONS:  1. Assess impact of patient's symptoms on level of functioning, self care needs and offer support as indicated  2. Assess patient/family knowledge of depression, impact on illness and need for teaching  3. Provide emotional support, presence and reassurance  4. Assess for possible suicidal thoughts or ideation. If patient expresses suicidal thoughts or statements do not leave alone, initiate Suicide Precautions, move to a room close to the nursing station and obtain sitter  5. Initiate consults as appropriate with Mental Health Professional, Spiritual Care, Psychosocial CNS, and consider a recommendation to the LIP for a Psychiatric Consultation  5/14/2024 0049 by Carmelina Goldstein RN  Outcome: Progressing     Problem: Involuntary Admit  Goal: Will cooperate with staff recommendations and doctor's orders and will 
  Problem: Discharge Planning  Goal: Discharge to home or other facility with appropriate resources  5/15/2024 0049 by Carmelina Goldstein RN  Outcome: Progressing     Problem: Risk for Elopement  Goal: Patient will not exit the unit/facility without proper excort  5/15/2024 0049 by Carmelina Goldstein RN  Outcome: Progressing     Problem: Safety - Adult  Goal: Free from fall injury  5/15/2024 0049 by Carmelina Goldstein RN  Outcome: Progressing     Problem: Anxiety  Goal: Will report anxiety at manageable levels  Description: INTERVENTIONS:  1. Administer medication as ordered  2. Teach and rehearse alternative coping skills  3. Provide emotional support with 1:1 interaction with staff  5/15/2024 0049 by Carmelina Goldstein RN  Outcome: Progressing     Problem: Depression/Self Harm  Goal: Effect of psychiatric condition will be minimized and patient will be protected from self harm  Description: INTERVENTIONS:  1. Assess impact of patient's symptoms on level of functioning, self care needs and offer support as indicated  2. Assess patient/family knowledge of depression, impact on illness and need for teaching  3. Provide emotional support, presence and reassurance  4. Assess for possible suicidal thoughts or ideation. If patient expresses suicidal thoughts or statements do not leave alone, initiate Suicide Precautions, move to a room close to the nursing station and obtain sitter  5. Initiate consults as appropriate with Mental Health Professional, Spiritual Care, Psychosocial CNS, and consider a recommendation to the LIP for a Psychiatric Consultation  5/15/2024 0049 by Carmelina Goldstein RN  Outcome: Progressing     Problem: Involuntary Admit  Goal: Will cooperate with staff recommendations and doctor's orders and will demonstrate appropriate behavior  Description: INTERVENTIONS:  1. Treat underlying conditions and offer medication as ordered  2. Educate regarding involuntary admission procedures and rules  3. Contain excessive/inappropriate 
  Problem: Discharge Planning  Goal: Discharge to home or other facility with appropriate resources  Outcome: Progressing     Problem: Risk for Elopement  Goal: Patient will not exit the unit/facility without proper excort  Outcome: Progressing     Problem: Safety - Adult  Goal: Free from fall injury  Outcome: Progressing     Problem: Anxiety  Goal: Will report anxiety at manageable levels  Description: INTERVENTIONS:  1. Administer medication as ordered  2. Teach and rehearse alternative coping skills  3. Provide emotional support with 1:1 interaction with staff  Outcome: Progressing     Problem: Depression/Self Harm  Goal: Effect of psychiatric condition will be minimized and patient will be protected from self harm  Description: INTERVENTIONS:  1. Assess impact of patient's symptoms on level of functioning, self care needs and offer support as indicated  2. Assess patient/family knowledge of depression, impact on illness and need for teaching  3. Provide emotional support, presence and reassurance  4. Assess for possible suicidal thoughts or ideation. If patient expresses suicidal thoughts or statements do not leave alone, initiate Suicide Precautions, move to a room close to the nursing station and obtain sitter  5. Initiate consults as appropriate with Mental Health Professional, Spiritual Care, Psychosocial CNS, and consider a recommendation to the LIP for a Psychiatric Consultation  Outcome: Progressing     Problem: Involuntary Admit  Goal: Will cooperate with staff recommendations and doctor's orders and will demonstrate appropriate behavior  Description: INTERVENTIONS:  1. Treat underlying conditions and offer medication as ordered  2. Educate regarding involuntary admission procedures and rules  3. Contain excessive/inappropriate behavior per unit and hospital policies  Outcome: Progressing     Problem: Self Harm/Suicidality  Goal: Will have no self-injury during hospital stay  Description: 
  Problem: Risk for Elopement  Goal: Patient will not exit the unit/facility without proper excort  Outcome: Progressing  Flowsheets (Taken 5/13/2024 0600 by Chi Sparks RN)  Nursing Interventions for Elopement Risk:   Collaborate with treatment team for drug withdrawal symptoms treatment   Collaborate with family members/caregivers to mitigate the elopement risk     Problem: Safety - Adult  Goal: Free from fall injury  Outcome: Progressing  Pt assessed as a fall risk this shift. Remains free from falls and accidental injury at this time. Fall precautions in place, including falling star sign and fall risk band on pt. Floor free from obstacles, and bed is locked and in lowest position. Adequate lighting provided.  Pt encouraged to call before getting OOB for any need.  Bed alarm activated. Will continue to monitor needs during hourly rounding, and reinforce education on use of call light.     Problem: Anxiety  Goal: Will report anxiety at manageable levels  Description: INTERVENTIONS:  1. Administer medication as ordered  2. Teach and rehearse alternative coping skills  3. Provide emotional support with 1:1 interaction with staff  Outcome: Progressing     Problem: Depression/Self Harm  Goal: Effect of psychiatric condition will be minimized and patient will be protected from self harm  Description: INTERVENTIONS:  1. Assess impact of patient's symptoms on level of functioning, self care needs and offer support as indicated  2. Assess patient/family knowledge of depression, impact on illness and need for teaching  3. Provide emotional support, presence and reassurance  4. Assess for possible suicidal thoughts or ideation. If patient expresses suicidal thoughts or statements do not leave alone, initiate Suicide Precautions, move to a room close to the nursing station and obtain sitter  5. Initiate consults as appropriate with Mental Health Professional, Spiritual Care, Psychosocial CNS, and consider a 
RN  Outcome: Progressing     Problem: Involuntary Admit  Goal: Will cooperate with staff recommendations and doctor's orders and will demonstrate appropriate behavior  Description: INTERVENTIONS:  1. Treat underlying conditions and offer medication as ordered  2. Educate regarding involuntary admission procedures and rules  3. Contain excessive/inappropriate behavior per unit and hospital policies  5/15/2024 0940 by Viviane Kim RN  Outcome: Adequate for Discharge  5/15/2024 0049 by Carmelina Goldstein RN  Outcome: Progressing     Problem: Self Harm/Suicidality  Goal: Will have no self-injury during hospital stay  Description: INTERVENTIONS:  1.  Ensure constant observer at bedside with Q15M safety checks  2.  Maintain a safe environment  3.  Secure patient belongings  4.  Ensure family/visitors adhere to safety recommendations  5.  Ensure safety tray has been added to patient's diet order  6.  Every shift and PRN: Re-assess suicidal risk via Frequent Screener    5/15/2024 0940 by Viviane Kim RN  Outcome: Adequate for Discharge  5/15/2024 0049 by Carmelina Goldstein RN  Outcome: Progressing     Problem: ABCDS Injury Assessment  Goal: Absence of physical injury  Outcome: Adequate for Discharge

## 2024-05-15 NOTE — DISCHARGE INSTRUCTIONS
Please avoid drug use, consider enrolling in a drug addiction program if you need help      Follow up with your psychiatrist, they were notified of you admission and discharged medications.

## 2024-05-15 NOTE — CARE COORDINATION
ONGOING DISCHARGE PLAN:    Patient is alert and oriented x4.    Spoke with patient regarding discharge plan and patient confirms that plan is still home. Denies needs.        +crystal meth upon admit    Psych consult    States she will follow up with her Psychiatrist Dr Garcia    States she knows how to follow with [facility] and does not want further drug resource information.    Will continue to follow for additional discharge needs.    If patient is discharged prior to next notation, then this note serves as note for discharge by case management.    Electronically signed by Joann Austin RN on 5/15/2024 at 9:17 AM